# Patient Record
Sex: FEMALE | Race: BLACK OR AFRICAN AMERICAN | NOT HISPANIC OR LATINO | Employment: FULL TIME | URBAN - METROPOLITAN AREA
[De-identification: names, ages, dates, MRNs, and addresses within clinical notes are randomized per-mention and may not be internally consistent; named-entity substitution may affect disease eponyms.]

---

## 2023-03-07 ENCOUNTER — TELEPHONE (OUTPATIENT)
Dept: PRIMARY CARE | Facility: CLINIC | Age: 33
End: 2023-03-07

## 2023-03-07 NOTE — TELEPHONE ENCOUNTER
Pt has questions about receiving the wrong medication she was given metformin extended release and it was suppose to be regular can call pt at [phone]

## 2023-03-10 DIAGNOSIS — E11.9 TYPE 2 DIABETES MELLITUS WITHOUT COMPLICATION, WITHOUT LONG-TERM CURRENT USE OF INSULIN (MULTI): Primary | ICD-10-CM

## 2023-03-10 RX ORDER — METFORMIN HYDROCHLORIDE 500 MG/1
1000 TABLET, EXTENDED RELEASE ORAL
Qty: 180 TABLET | Refills: 0 | Status: SHIPPED
Start: 2023-03-10 | End: 2023-04-19 | Stop reason: SINTOL

## 2023-04-19 ENCOUNTER — OFFICE VISIT (OUTPATIENT)
Dept: PRIMARY CARE | Facility: CLINIC | Age: 33
End: 2023-04-19
Payer: COMMERCIAL

## 2023-04-19 VITALS
SYSTOLIC BLOOD PRESSURE: 106 MMHG | HEIGHT: 63 IN | BODY MASS INDEX: 34.02 KG/M2 | WEIGHT: 192 LBS | HEART RATE: 103 BPM | DIASTOLIC BLOOD PRESSURE: 69 MMHG | TEMPERATURE: 97.5 F

## 2023-04-19 DIAGNOSIS — I10 HYPERTENSION, UNSPECIFIED TYPE: ICD-10-CM

## 2023-04-19 DIAGNOSIS — G44.209 TENSION HEADACHE: ICD-10-CM

## 2023-04-19 DIAGNOSIS — E11.9 DIABETES MELLITUS TYPE 2 WITHOUT RETINOPATHY (MULTI): Primary | ICD-10-CM

## 2023-04-19 PROBLEM — I44.0 FIRST DEGREE AV BLOCK: Status: ACTIVE | Noted: 2023-04-19

## 2023-04-19 PROBLEM — G43.909 MIGRAINE HEADACHE: Status: ACTIVE | Noted: 2023-04-19

## 2023-04-19 PROBLEM — M54.6 CHRONIC THORACIC BACK PAIN: Status: ACTIVE | Noted: 2023-04-19

## 2023-04-19 PROBLEM — D24.1 INTRADUCTAL PAPILLOMA OF RIGHT BREAST: Status: ACTIVE | Noted: 2023-04-19

## 2023-04-19 PROBLEM — E78.3 HYPERCHYLOMICRONEMIA: Status: ACTIVE | Noted: 2023-04-19

## 2023-04-19 PROBLEM — M62.82 RHABDOMYOLYSIS: Status: ACTIVE | Noted: 2023-04-19

## 2023-04-19 PROBLEM — G89.29 CHRONIC THORACIC BACK PAIN: Status: ACTIVE | Noted: 2023-04-19

## 2023-04-19 PROBLEM — N63.10 BREAST MASS, RIGHT: Status: ACTIVE | Noted: 2023-04-19

## 2023-04-19 PROBLEM — E66.9 OBESITY: Status: ACTIVE | Noted: 2023-04-19

## 2023-04-19 PROBLEM — R74.8 ELEVATED LIVER ENZYMES: Status: ACTIVE | Noted: 2023-04-19

## 2023-04-19 PROBLEM — I44.1 SECOND DEGREE AV BLOCK, MOBITZ TYPE I: Status: ACTIVE | Noted: 2023-04-19

## 2023-04-19 PROCEDURE — 3078F DIAST BP <80 MM HG: CPT | Performed by: FAMILY MEDICINE

## 2023-04-19 PROCEDURE — 99214 OFFICE O/P EST MOD 30 MIN: CPT | Performed by: FAMILY MEDICINE

## 2023-04-19 PROCEDURE — 3074F SYST BP LT 130 MM HG: CPT | Performed by: FAMILY MEDICINE

## 2023-04-19 RX ORDER — FLASH GLUCOSE SENSOR
KIT MISCELLANEOUS
COMMUNITY
Start: 2023-02-15 | End: 2023-07-19 | Stop reason: SDUPTHER

## 2023-04-19 RX ORDER — SEMAGLUTIDE 2.68 MG/ML
INJECTION, SOLUTION SUBCUTANEOUS
COMMUNITY
Start: 2022-09-16 | End: 2023-07-19 | Stop reason: SDUPTHER

## 2023-04-19 RX ORDER — LOSARTAN POTASSIUM AND HYDROCHLOROTHIAZIDE 25; 100 MG/1; MG/1
1 TABLET ORAL DAILY
Qty: 90 TABLET | Refills: 1 | Status: SHIPPED
Start: 2023-04-19 | End: 2023-11-09 | Stop reason: SDUPTHER

## 2023-04-19 RX ORDER — LOSARTAN POTASSIUM AND HYDROCHLOROTHIAZIDE 25; 100 MG/1; MG/1
1 TABLET ORAL DAILY
COMMUNITY
Start: 2016-12-19 | End: 2023-04-19

## 2023-04-19 RX ORDER — METFORMIN HYDROCHLORIDE 500 MG/1
TABLET ORAL 2 TIMES DAILY
COMMUNITY
End: 2023-07-19 | Stop reason: SDUPTHER

## 2023-04-19 RX ORDER — LOSARTAN POTASSIUM AND HYDROCHLOROTHIAZIDE 12.5; 1 MG/1; MG/1
TABLET ORAL
COMMUNITY
End: 2023-04-19

## 2023-04-19 RX ORDER — FLASH GLUCOSE SENSOR
KIT MISCELLANEOUS
Qty: 6 EACH | Refills: 1 | Status: SHIPPED
Start: 2023-04-19 | End: 2023-11-09 | Stop reason: SDUPTHER

## 2023-04-19 ASSESSMENT — PAIN SCALES - GENERAL: PAINLEVEL: 10-WORST PAIN EVER

## 2023-04-19 NOTE — PROGRESS NOTES
Subjective   Patient ID: Mica Ross is a 32 y.o. female who presents for Follow-up (Pain in back of neck ).  HPI  The patient is here for the management of a  left occipital pain.  It started a months ago.  It is worse when she is stressed.  Better with NSAIDS OTC.  No associated paresthesia or known local trauma.    A review of system was completed.  All systems were reviewed and were normal, except for the ones that are listed in the HPI.    Objective   Physical Exam  Constitutional:       Appearance: Normal appearance.   HENT:      Head: Normocephalic and atraumatic.      Right Ear: Tympanic membrane, ear canal and external ear normal.      Left Ear: Tympanic membrane, ear canal and external ear normal.      Nose: Nose normal.      Mouth/Throat:      Mouth: Mucous membranes are moist.      Pharynx: Oropharynx is clear.   Eyes:      Extraocular Movements: Extraocular movements intact.      Conjunctiva/sclera: Conjunctivae normal.      Pupils: Pupils are equal, round, and reactive to light.   Cardiovascular:      Rate and Rhythm: Normal rate and regular rhythm.      Pulses: Normal pulses.   Pulmonary:      Effort: Pulmonary effort is normal.      Breath sounds: Normal breath sounds.   Abdominal:      General: Abdomen is flat. Bowel sounds are normal.      Palpations: Abdomen is soft.   Musculoskeletal:         General: Normal range of motion.      Cervical back: Normal range of motion and neck supple.      Comments: Left low occipital tenderness to palpation.   Skin:     General: Skin is warm.   Neurological:      General: No focal deficit present.      Mental Status: She is alert and oriented to person, place, and time. Mental status is at baseline.   Psychiatric:         Mood and Affect: Mood normal.         Behavior: Behavior normal.         Thought Content: Thought content normal.         Judgment: Judgment normal.         Assessment/Plan   Problem List Items Addressed This Visit          Nervous    Tension  headache     -Medrol dose pack declined today.  -Aleve BID for 2 weeks, then PRN.  -C-spine X-ray ordered.         Relevant Orders    XR cervical spine 2-3 views       Circulatory    Hypertension    Relevant Medications    losartan-hydrochlorothiazide (Hyzaar) 100-25 mg tablet       Endocrine/Metabolic    Diabetes mellitus type 2 without retinopathy (CMS/HCC) - Primary    Relevant Medications    FreeStyle Ton sensor system (FreeStyle Ton 2 Sensor) kit

## 2023-07-19 ENCOUNTER — OFFICE VISIT (OUTPATIENT)
Dept: PRIMARY CARE | Facility: CLINIC | Age: 33
End: 2023-07-19
Payer: COMMERCIAL

## 2023-07-19 VITALS
SYSTOLIC BLOOD PRESSURE: 109 MMHG | HEART RATE: 92 BPM | TEMPERATURE: 97.6 F | HEIGHT: 63 IN | DIASTOLIC BLOOD PRESSURE: 75 MMHG | WEIGHT: 191 LBS | BODY MASS INDEX: 33.84 KG/M2

## 2023-07-19 DIAGNOSIS — R52 PAIN: ICD-10-CM

## 2023-07-19 DIAGNOSIS — E11.9 DIABETES MELLITUS TYPE 2 WITHOUT RETINOPATHY (MULTI): ICD-10-CM

## 2023-07-19 DIAGNOSIS — M54.12 CERVICAL RADICULOPATHY: Primary | ICD-10-CM

## 2023-07-19 PROCEDURE — 99214 OFFICE O/P EST MOD 30 MIN: CPT | Performed by: FAMILY MEDICINE

## 2023-07-19 PROCEDURE — 3078F DIAST BP <80 MM HG: CPT | Performed by: FAMILY MEDICINE

## 2023-07-19 PROCEDURE — 3074F SYST BP LT 130 MM HG: CPT | Performed by: FAMILY MEDICINE

## 2023-07-19 PROCEDURE — 1036F TOBACCO NON-USER: CPT | Performed by: FAMILY MEDICINE

## 2023-07-19 RX ORDER — METFORMIN HYDROCHLORIDE 500 MG/1
500 TABLET ORAL
Qty: 180 TABLET | Refills: 1 | Status: SHIPPED | OUTPATIENT
Start: 2023-07-19 | End: 2023-11-09 | Stop reason: SDUPTHER

## 2023-07-19 RX ORDER — SEMAGLUTIDE 2.68 MG/ML
2 INJECTION, SOLUTION SUBCUTANEOUS
Qty: 3 ML | Refills: 5 | Status: SHIPPED | OUTPATIENT
Start: 2023-07-19 | End: 2023-11-09 | Stop reason: SDUPTHER

## 2023-07-19 RX ORDER — FLASH GLUCOSE SENSOR
KIT MISCELLANEOUS
Qty: 2 EACH | Refills: 5 | Status: SHIPPED | OUTPATIENT
Start: 2023-07-19

## 2023-07-19 RX ORDER — NAPROXEN 500 MG/1
500 TABLET ORAL 2 TIMES DAILY PRN
Qty: 60 TABLET | Refills: 0 | Status: SHIPPED | OUTPATIENT
Start: 2023-07-19 | End: 2023-10-17

## 2023-07-19 RX ORDER — GABAPENTIN 100 MG/1
100 CAPSULE ORAL 3 TIMES DAILY
Qty: 60 CAPSULE | Refills: 2 | Status: SHIPPED
Start: 2023-07-19 | End: 2023-11-09 | Stop reason: WASHOUT

## 2023-07-19 RX ORDER — GABAPENTIN 100 MG/1
100 CAPSULE ORAL 3 TIMES DAILY
Qty: 60 CAPSULE | Refills: 2 | Status: SHIPPED
Start: 2023-07-19 | End: 2023-07-19 | Stop reason: SDUPTHER

## 2023-07-19 RX ORDER — METHYLPREDNISOLONE 4 MG/1
TABLET ORAL
Qty: 21 TABLET | Refills: 0 | Status: SHIPPED | OUTPATIENT
Start: 2023-07-19 | End: 2023-07-26

## 2023-07-19 ASSESSMENT — ENCOUNTER SYMPTOMS: NECK PAIN: 1

## 2023-07-19 ASSESSMENT — PAIN SCALES - GENERAL: PAINLEVEL: 0-NO PAIN

## 2023-07-19 NOTE — PROGRESS NOTES
Subjective   Patient ID: Mica Ross is a 32 y.o. female who presents for Neck Pain.  Neck Pain     The patient is here for a follow-up visit to discuss her left low posterior cervical pain.  It started about 4-5 months ago.  Not responding to NSAIDS.  Never radiates into the left hand.  Last C-spine Xray was normal on 4/19/2023.    A review of system was completed.  All systems were reviewed and were normal, except for the ones that are listed in the HPI.    Objective   Physical Exam  Constitutional:       Appearance: Normal appearance.   HENT:      Head: Normocephalic and atraumatic.      Right Ear: Tympanic membrane, ear canal and external ear normal.      Left Ear: Tympanic membrane, ear canal and external ear normal.      Nose: Nose normal.      Mouth/Throat:      Mouth: Mucous membranes are moist.      Pharynx: Oropharynx is clear.   Eyes:      Extraocular Movements: Extraocular movements intact.      Conjunctiva/sclera: Conjunctivae normal.      Pupils: Pupils are equal, round, and reactive to light.   Cardiovascular:      Rate and Rhythm: Normal rate and regular rhythm.      Pulses: Normal pulses.   Pulmonary:      Effort: Pulmonary effort is normal.      Breath sounds: Normal breath sounds.   Abdominal:      General: Abdomen is flat. Bowel sounds are normal.      Palpations: Abdomen is soft.   Musculoskeletal:         General: Normal range of motion.      Cervical back: Normal range of motion and neck supple.      Comments: Left low posterior cervical spine tenderness to palpation.  There is a lump/ fulness with tenderness to palpation in the low C-spine posterior region.   Skin:     General: Skin is warm.   Neurological:      General: No focal deficit present.      Mental Status: She is alert and oriented to person, place, and time. Mental status is at baseline.   Psychiatric:         Mood and Affect: Mood normal.         Behavior: Behavior normal.         Thought Content: Thought content normal.          Judgment: Judgment normal.         Assessment/Plan   Problem List Items Addressed This Visit       Diabetes mellitus type 2 without retinopathy (CMS/HCC)     -Metformin refilled.          Relevant Medications    metFORMIN (Glucophage) 500 mg tablet    semaglutide (Ozempic) 2 mg/dose (8 mg/3 mL) pen injector    FreeStyle Ton 2 Sensor kit    Cervical radiculopathy - Primary     -PT referral, Medrol dose pack and Naproxen started today.          Relevant Medications    methylPREDNISolone (Medrol Dospak) 4 mg tablets    naproxen (Naprosyn) 500 mg tablet    gabapentin (Neurontin) 100 mg capsule    Other Relevant Orders    PT eval and treat     Other Visit Diagnoses       Pain        Relevant Orders    PT eval and treat

## 2023-09-15 ENCOUNTER — HOSPITAL ENCOUNTER (OUTPATIENT)
Dept: DATA CONVERSION | Facility: HOSPITAL | Age: 33
Discharge: HOME | End: 2023-09-15
Payer: COMMERCIAL

## 2023-09-15 DIAGNOSIS — M54.12 RADICULOPATHY, CERVICAL REGION: ICD-10-CM

## 2023-10-03 ENCOUNTER — TELEPHONE (OUTPATIENT)
Dept: PRIMARY CARE | Facility: CLINIC | Age: 33
End: 2023-10-03
Payer: COMMERCIAL

## 2023-10-03 DIAGNOSIS — M54.12 CERVICAL RADICULOPATHY: Primary | ICD-10-CM

## 2023-10-03 NOTE — TELEPHONE ENCOUNTER
Patient called in today stating that she's still having pain and would like to have the MRI put in that was discussed per her last office visit.

## 2023-10-04 NOTE — TELEPHONE ENCOUNTER
MRI of the cervical spine for neck was placed in the system.  Gave to the patient the referral number for the radiology number so that she can schedule the test.

## 2023-10-18 ENCOUNTER — APPOINTMENT (OUTPATIENT)
Dept: RADIOLOGY | Facility: CLINIC | Age: 33
End: 2023-10-18
Payer: COMMERCIAL

## 2023-10-26 ENCOUNTER — TELEPHONE (OUTPATIENT)
Dept: PRIMARY CARE | Facility: CLINIC | Age: 33
End: 2023-10-26

## 2023-11-09 ENCOUNTER — OFFICE VISIT (OUTPATIENT)
Dept: PRIMARY CARE | Facility: CLINIC | Age: 33
End: 2023-11-09
Payer: COMMERCIAL

## 2023-11-09 VITALS
DIASTOLIC BLOOD PRESSURE: 84 MMHG | WEIGHT: 190 LBS | HEART RATE: 96 BPM | SYSTOLIC BLOOD PRESSURE: 115 MMHG | TEMPERATURE: 98 F | BODY MASS INDEX: 33.66 KG/M2

## 2023-11-09 DIAGNOSIS — E11.9 DIABETES MELLITUS TYPE 2 WITHOUT RETINOPATHY (MULTI): ICD-10-CM

## 2023-11-09 DIAGNOSIS — I10 HYPERTENSION, UNSPECIFIED TYPE: ICD-10-CM

## 2023-11-09 DIAGNOSIS — M54.12 CERVICAL RADICULOPATHY: Primary | ICD-10-CM

## 2023-11-09 PROCEDURE — 3074F SYST BP LT 130 MM HG: CPT | Performed by: FAMILY MEDICINE

## 2023-11-09 PROCEDURE — 3079F DIAST BP 80-89 MM HG: CPT | Performed by: FAMILY MEDICINE

## 2023-11-09 PROCEDURE — 99214 OFFICE O/P EST MOD 30 MIN: CPT | Performed by: FAMILY MEDICINE

## 2023-11-09 PROCEDURE — 1036F TOBACCO NON-USER: CPT | Performed by: FAMILY MEDICINE

## 2023-11-09 RX ORDER — FLASH GLUCOSE SENSOR
KIT MISCELLANEOUS
Qty: 6 EACH | Refills: 1 | Status: SHIPPED | OUTPATIENT
Start: 2023-11-09

## 2023-11-09 RX ORDER — METFORMIN HYDROCHLORIDE 500 MG/1
500 TABLET ORAL
Qty: 180 TABLET | Refills: 1 | Status: SHIPPED | OUTPATIENT
Start: 2023-11-09 | End: 2024-02-26 | Stop reason: SDUPTHER

## 2023-11-09 RX ORDER — SEMAGLUTIDE 2.68 MG/ML
2 INJECTION, SOLUTION SUBCUTANEOUS
Qty: 3 ML | Refills: 5 | Status: SHIPPED | OUTPATIENT
Start: 2023-11-09 | End: 2024-01-24

## 2023-11-09 RX ORDER — LOSARTAN POTASSIUM AND HYDROCHLOROTHIAZIDE 25; 100 MG/1; MG/1
1 TABLET ORAL DAILY
Qty: 90 TABLET | Refills: 1 | Status: SHIPPED | OUTPATIENT
Start: 2023-11-09 | End: 2024-02-26 | Stop reason: SDUPTHER

## 2023-11-09 ASSESSMENT — ENCOUNTER SYMPTOMS: NECK PAIN: 1

## 2023-11-09 NOTE — PROGRESS NOTES
Subjective   Patient ID: Mica Ross is a 33 y.o. female who presents for Neck Pain.  Neck Pain     The patient is here for a follow-up visit to discuss her left low posterior cervical pain.  It started about 4-5 months ago.  Not responding to NSAIDS and 6 weeks of PT.  Never radiates into the left hand.  Last C-spine Xray was normal on 4/19/2023.  She is also here for her medication refills.    A review of system was completed.  All systems were reviewed and were normal, except for the ones that are listed in the HPI.    Objective   Physical Exam  Constitutional:       Appearance: Normal appearance.   HENT:      Head: Normocephalic and atraumatic.      Right Ear: Tympanic membrane, ear canal and external ear normal.      Left Ear: Tympanic membrane, ear canal and external ear normal.      Nose: Nose normal.      Mouth/Throat:      Mouth: Mucous membranes are moist.      Pharynx: Oropharynx is clear.   Eyes:      Extraocular Movements: Extraocular movements intact.      Conjunctiva/sclera: Conjunctivae normal.      Pupils: Pupils are equal, round, and reactive to light.   Cardiovascular:      Rate and Rhythm: Normal rate and regular rhythm.      Pulses: Normal pulses.   Pulmonary:      Effort: Pulmonary effort is normal.      Breath sounds: Normal breath sounds.   Abdominal:      General: Abdomen is flat. Bowel sounds are normal.      Palpations: Abdomen is soft.   Musculoskeletal:         General: Normal range of motion.      Cervical back: Normal range of motion and neck supple.      Comments: Left low posterior cervical spine tenderness to palpation.  There is a lump/ fulness with tenderness to palpation in the low C-spine posterior region.   Skin:     General: Skin is warm.   Neurological:      General: No focal deficit present.      Mental Status: She is alert and oriented to person, place, and time. Mental status is at baseline.   Psychiatric:         Mood and Affect: Mood normal.         Behavior: Behavior  normal.         Thought Content: Thought content normal.         Judgment: Judgment normal.     Assessment/Plan   Problem List Items Addressed This Visit       Diabetes mellitus type 2 without retinopathy (CMS/HCC)     -Blood test ordered.  -Medications refilled.  -Influenza and Prevnar declined.   -Continue diet and exercise.           Relevant Medications    semaglutide (Ozempic) 2 mg/dose (8 mg/3 mL) pen injector    metFORMIN (Glucophage) 500 mg tablet    FreeStyle Ton sensor system (FreeStyle Ton 2 Sensor) kit    Hypertension    Relevant Medications    losartan-hydrochlorothiazide (Hyzaar) 100-25 mg tablet    Cervical radiculopathy - Primary     -suspect a cervical radiculitis.   -MRI C-spine ordered.   -Pain management referral done today as well.          Relevant Orders    MR cervical spine wo IV contrast

## 2023-11-09 NOTE — ASSESSMENT & PLAN NOTE
-Blood test ordered.  -Medications refilled.  -Influenza and Prevnar declined.   -Continue diet and exercise.

## 2023-11-09 NOTE — ASSESSMENT & PLAN NOTE
-suspect a cervical radiculitis.   -MRI C-spine ordered.   -Pain management referral done today as well.

## 2023-11-24 ENCOUNTER — ANCILLARY PROCEDURE (OUTPATIENT)
Dept: RADIOLOGY | Facility: CLINIC | Age: 33
End: 2023-11-24
Payer: COMMERCIAL

## 2023-11-24 DIAGNOSIS — M54.12 CERVICAL RADICULOPATHY: ICD-10-CM

## 2023-11-24 PROCEDURE — 72141 MRI NECK SPINE W/O DYE: CPT | Performed by: RADIOLOGY

## 2023-11-24 PROCEDURE — 72141 MRI NECK SPINE W/O DYE: CPT

## 2024-02-26 ENCOUNTER — OFFICE VISIT (OUTPATIENT)
Dept: PRIMARY CARE | Facility: CLINIC | Age: 34
End: 2024-02-26
Payer: COMMERCIAL

## 2024-02-26 ENCOUNTER — LAB (OUTPATIENT)
Dept: LAB | Facility: LAB | Age: 34
End: 2024-02-26
Payer: COMMERCIAL

## 2024-02-26 VITALS
TEMPERATURE: 97.8 F | DIASTOLIC BLOOD PRESSURE: 82 MMHG | HEART RATE: 98 BPM | BODY MASS INDEX: 34.54 KG/M2 | SYSTOLIC BLOOD PRESSURE: 115 MMHG | WEIGHT: 195 LBS

## 2024-02-26 DIAGNOSIS — Z13.1 DIABETES MELLITUS SCREENING: ICD-10-CM

## 2024-02-26 DIAGNOSIS — Z00.00 HEALTH CARE MAINTENANCE: Primary | ICD-10-CM

## 2024-02-26 DIAGNOSIS — Z11.59 ENCOUNTER FOR HEPATITIS C SCREENING TEST FOR LOW RISK PATIENT: ICD-10-CM

## 2024-02-26 DIAGNOSIS — E11.9 DIABETES MELLITUS TYPE 2 WITHOUT RETINOPATHY (MULTI): ICD-10-CM

## 2024-02-26 DIAGNOSIS — I10 HYPERTENSION, UNSPECIFIED TYPE: ICD-10-CM

## 2024-02-26 DIAGNOSIS — Z13.220 SCREENING CHOLESTEROL LEVEL: ICD-10-CM

## 2024-02-26 DIAGNOSIS — Z23 IMMUNIZATION DUE: ICD-10-CM

## 2024-02-26 DIAGNOSIS — Z01.00 ENCOUNTER FOR VISION SCREENING: ICD-10-CM

## 2024-02-26 DIAGNOSIS — Z11.4 ENCOUNTER FOR SCREENING FOR HIV: ICD-10-CM

## 2024-02-26 LAB
ALBUMIN SERPL BCP-MCNC: 4.4 G/DL (ref 3.4–5)
ALP SERPL-CCNC: 38 U/L (ref 33–110)
ALT SERPL W P-5'-P-CCNC: 28 U/L (ref 7–45)
ANION GAP SERPL CALC-SCNC: 12 MMOL/L (ref 10–20)
AST SERPL W P-5'-P-CCNC: 20 U/L (ref 9–39)
BILIRUB SERPL-MCNC: 0.5 MG/DL (ref 0–1.2)
BUN SERPL-MCNC: 12 MG/DL (ref 6–23)
CALCIUM SERPL-MCNC: 9.8 MG/DL (ref 8.6–10.6)
CHLORIDE SERPL-SCNC: 103 MMOL/L (ref 98–107)
CHOLEST SERPL-MCNC: 99 MG/DL (ref 0–199)
CHOLESTEROL/HDL RATIO: 2.9
CO2 SERPL-SCNC: 29 MMOL/L (ref 21–32)
CREAT SERPL-MCNC: 1.01 MG/DL (ref 0.5–1.05)
CREAT UR-MCNC: 273.3 MG/DL (ref 20–320)
EGFRCR SERPLBLD CKD-EPI 2021: 75 ML/MIN/1.73M*2
ERYTHROCYTE [DISTWIDTH] IN BLOOD BY AUTOMATED COUNT: 12.6 % (ref 11.5–14.5)
EST. AVERAGE GLUCOSE BLD GHB EST-MCNC: 126 MG/DL
GLUCOSE SERPL-MCNC: 123 MG/DL (ref 74–99)
HBA1C MFR BLD: 6 %
HCT VFR BLD AUTO: 40.5 % (ref 36–46)
HCV AB SER QL: NONREACTIVE
HDLC SERPL-MCNC: 34.7 MG/DL
HGB BLD-MCNC: 14.2 G/DL (ref 12–16)
LDLC SERPL CALC-MCNC: 49 MG/DL
MCH RBC QN AUTO: 30.3 PG (ref 26–34)
MCHC RBC AUTO-ENTMCNC: 35.1 G/DL (ref 32–36)
MCV RBC AUTO: 86 FL (ref 80–100)
MICROALBUMIN UR-MCNC: 11.7 MG/L
MICROALBUMIN/CREAT UR: 4.3 UG/MG CREAT
NON HDL CHOLESTEROL: 64 MG/DL (ref 0–149)
NRBC BLD-RTO: 0 /100 WBCS (ref 0–0)
PLATELET # BLD AUTO: 310 X10*3/UL (ref 150–450)
POTASSIUM SERPL-SCNC: 3.7 MMOL/L (ref 3.5–5.3)
PROT SERPL-MCNC: 7.1 G/DL (ref 6.4–8.2)
RBC # BLD AUTO: 4.69 X10*6/UL (ref 4–5.2)
SODIUM SERPL-SCNC: 140 MMOL/L (ref 136–145)
TRIGL SERPL-MCNC: 76 MG/DL (ref 0–149)
TSH SERPL-ACNC: 2.02 MIU/L (ref 0.44–3.98)
VLDL: 15 MG/DL (ref 0–40)
WBC # BLD AUTO: 6.3 X10*3/UL (ref 4.4–11.3)

## 2024-02-26 PROCEDURE — 36415 COLL VENOUS BLD VENIPUNCTURE: CPT

## 2024-02-26 PROCEDURE — 86803 HEPATITIS C AB TEST: CPT

## 2024-02-26 PROCEDURE — 82570 ASSAY OF URINE CREATININE: CPT

## 2024-02-26 PROCEDURE — 1036F TOBACCO NON-USER: CPT | Performed by: FAMILY MEDICINE

## 2024-02-26 PROCEDURE — 3074F SYST BP LT 130 MM HG: CPT | Performed by: FAMILY MEDICINE

## 2024-02-26 PROCEDURE — 82043 UR ALBUMIN QUANTITATIVE: CPT

## 2024-02-26 PROCEDURE — 84443 ASSAY THYROID STIM HORMONE: CPT

## 2024-02-26 PROCEDURE — 99395 PREV VISIT EST AGE 18-39: CPT | Performed by: FAMILY MEDICINE

## 2024-02-26 PROCEDURE — 80053 COMPREHEN METABOLIC PANEL: CPT

## 2024-02-26 PROCEDURE — 85027 COMPLETE CBC AUTOMATED: CPT

## 2024-02-26 PROCEDURE — 83036 HEMOGLOBIN GLYCOSYLATED A1C: CPT

## 2024-02-26 PROCEDURE — 80061 LIPID PANEL: CPT

## 2024-02-26 PROCEDURE — 3079F DIAST BP 80-89 MM HG: CPT | Performed by: FAMILY MEDICINE

## 2024-02-26 RX ORDER — TIRZEPATIDE 10 MG/.5ML
10 INJECTION, SOLUTION SUBCUTANEOUS
Qty: 2 ML | Refills: 1 | Status: SHIPPED
Start: 2024-02-26 | End: 2024-04-14 | Stop reason: WASHOUT

## 2024-02-26 RX ORDER — SEMAGLUTIDE 2.68 MG/ML
2 INJECTION, SOLUTION SUBCUTANEOUS
Qty: 9 ML | Refills: 1 | Status: SHIPPED | OUTPATIENT
Start: 2024-02-26 | End: 2024-04-14 | Stop reason: SDUPTHER

## 2024-02-26 RX ORDER — TIRZEPATIDE 12.5 MG/.5ML
12.5 INJECTION, SOLUTION SUBCUTANEOUS
Qty: 2 ML | Refills: 1 | Status: SHIPPED
Start: 2024-02-26 | End: 2024-04-14 | Stop reason: WASHOUT

## 2024-02-26 RX ORDER — LOSARTAN POTASSIUM AND HYDROCHLOROTHIAZIDE 25; 100 MG/1; MG/1
1 TABLET ORAL DAILY
Qty: 90 TABLET | Refills: 1 | Status: SHIPPED | OUTPATIENT
Start: 2024-02-26

## 2024-02-26 RX ORDER — METFORMIN HYDROCHLORIDE 500 MG/1
500 TABLET ORAL
Qty: 180 TABLET | Refills: 1 | Status: SHIPPED | OUTPATIENT
Start: 2024-02-26

## 2024-02-26 ASSESSMENT — PAIN SCALES - GENERAL: PAINLEVEL: 0-NO PAIN

## 2024-02-26 NOTE — PROGRESS NOTES
Subjective   Patient ID: Mica Ross is a 33 y.o. female who presents for Annual Exam (Check up ).  HPI    The patient is a 34 yo A female with history of DM II, HTN, obesity, migraine headaches, exercise induced rhabdomyolysis, here for:    1- CPE.  - blood and urine tests due.  - pap smear 9/13/2023.  Wnl.  No HPV.     2- Medication refills.  She did not loose weight on Ozempic but her DM II control improved.     3- DM II.  -eye exam was about 6 months ago at [facility].    A review of system was completed.  All systems were reviewed and were normal, except for the ones that are listed in the HPI.    Objective   Physical Exam  Constitutional:       Appearance: Normal appearance.   HENT:      Head: Normocephalic and atraumatic.      Right Ear: Tympanic membrane, ear canal and external ear normal.      Left Ear: Tympanic membrane, ear canal and external ear normal.      Nose: Nose normal.      Mouth/Throat:      Mouth: Mucous membranes are moist.      Pharynx: Oropharynx is clear.   Eyes:      Extraocular Movements: Extraocular movements intact.      Conjunctiva/sclera: Conjunctivae normal.      Pupils: Pupils are equal, round, and reactive to light.   Cardiovascular:      Rate and Rhythm: Normal rate and regular rhythm.      Pulses: Normal pulses.   Pulmonary:      Effort: Pulmonary effort is normal.      Breath sounds: Normal breath sounds.   Abdominal:      General: Abdomen is flat. Bowel sounds are normal.      Palpations: Abdomen is soft.   Musculoskeletal:         General: Normal range of motion.      Cervical back: Normal range of motion and neck supple.   Skin:     General: Skin is warm.   Neurological:      General: No focal deficit present.      Mental Status: She is alert and oriented to person, place, and time. Mental status is at baseline.   Psychiatric:         Mood and Affect: Mood normal.         Behavior: Behavior normal.         Thought Content: Thought content normal.         Judgment: Judgment  normal.     Assessment/Plan   Problem List Items Addressed This Visit       Diabetes mellitus type 2 without retinopathy (CMS/HCC)     -Weight gain not well controlled with Ozempic.  Changed to Mounjaro 10 mg/ week and 12.5 mg /week.          Relevant Medications    metFORMIN (Glucophage) 500 mg tablet    semaglutide (Ozempic) 2 mg/dose (8 mg/3 mL) pen injector    tirzepatide (Mounjaro) 10 mg/0.5 mL pen injector    tirzepatide (Mounjaro) 12.5 mg/0.5 mL pen injector    Other Relevant Orders    Albumin , Urine Random    CBC    Comprehensive Metabolic Panel    Hemoglobin A1C    Lipid Panel    TSH with reflex to Free T4 if abnormal    Hypertension    Relevant Medications    losartan-hydrochlorothiazide (Hyzaar) 100-25 mg tablet    Health care maintenance - Primary     - blood and urine tests due.  - pap smear 9/13/2023.  Wnl.  No HPV.            Encounter for screening for HIV    Encounter for hepatitis C screening test for low risk patient    Relevant Orders    Hepatitis C Antibody    Screening cholesterol level    Diabetes mellitus screening    Immunization due    Encounter for vision screening      Patient to return to office in 6 months for routine care.

## 2024-02-26 NOTE — ASSESSMENT & PLAN NOTE
-Weight gain not well controlled with Ozempic.  Changed to Mounjaro 10 mg/ week and 12.5 mg /week.

## 2024-04-12 ENCOUNTER — TELEPHONE (OUTPATIENT)
Dept: PRIMARY CARE | Facility: CLINIC | Age: 34
End: 2024-04-12
Payer: COMMERCIAL

## 2024-04-12 DIAGNOSIS — E11.9 DIABETES MELLITUS TYPE 2 WITHOUT RETINOPATHY (MULTI): ICD-10-CM

## 2024-04-12 NOTE — TELEPHONE ENCOUNTER
Pt states that the mounjaro is making her have muscle aches and pain pt wants to go back to ozempic as prescribed please advise.

## 2024-04-14 DIAGNOSIS — E11.9 DIABETES MELLITUS TYPE 2 WITHOUT RETINOPATHY (MULTI): ICD-10-CM

## 2024-04-14 RX ORDER — SEMAGLUTIDE 2.68 MG/ML
2 INJECTION, SOLUTION SUBCUTANEOUS
Qty: 9 ML | Refills: 1 | Status: SHIPPED | OUTPATIENT
Start: 2024-04-14 | End: 2024-04-14 | Stop reason: SDUPTHER

## 2024-04-14 RX ORDER — SEMAGLUTIDE 2.68 MG/ML
2 INJECTION, SOLUTION SUBCUTANEOUS
Qty: 9 ML | Refills: 1 | Status: SHIPPED | OUTPATIENT
Start: 2024-04-14

## 2024-06-02 DIAGNOSIS — E11.9 DIABETES MELLITUS TYPE 2 WITHOUT RETINOPATHY (MULTI): ICD-10-CM

## 2024-06-02 DIAGNOSIS — E78.2 MIXED HYPERLIPIDEMIA: ICD-10-CM

## 2024-06-03 RX ORDER — ATORVASTATIN CALCIUM 10 MG/1
10 TABLET, FILM COATED ORAL DAILY
Qty: 90 TABLET | Refills: 3 | Status: SHIPPED | OUTPATIENT
Start: 2024-06-03

## 2024-06-07 RX ORDER — FLASH GLUCOSE SENSOR
KIT MISCELLANEOUS
Qty: 100 EACH | Refills: 0 | Status: SHIPPED | OUTPATIENT
Start: 2024-06-07

## 2024-08-28 ENCOUNTER — OFFICE VISIT (OUTPATIENT)
Dept: PRIMARY CARE | Facility: CLINIC | Age: 34
End: 2024-08-28
Payer: COMMERCIAL

## 2024-08-28 ENCOUNTER — LAB (OUTPATIENT)
Dept: LAB | Facility: LAB | Age: 34
End: 2024-08-28
Payer: COMMERCIAL

## 2024-08-28 ENCOUNTER — APPOINTMENT (OUTPATIENT)
Dept: LAB | Facility: LAB | Age: 34
End: 2024-08-28
Payer: COMMERCIAL

## 2024-08-28 VITALS
BODY MASS INDEX: 33.83 KG/M2 | SYSTOLIC BLOOD PRESSURE: 104 MMHG | HEART RATE: 111 BPM | WEIGHT: 191 LBS | DIASTOLIC BLOOD PRESSURE: 74 MMHG | TEMPERATURE: 96.9 F

## 2024-08-28 DIAGNOSIS — Z11.1 SCREENING-PULMONARY TB: ICD-10-CM

## 2024-08-28 DIAGNOSIS — Z11.1 SCREENING EXAMINATION FOR PULMONARY TUBERCULOSIS: ICD-10-CM

## 2024-08-28 DIAGNOSIS — E11.9 DIABETES MELLITUS TYPE 2 WITHOUT RETINOPATHY (MULTI): Primary | ICD-10-CM

## 2024-08-28 DIAGNOSIS — E66.01 CLASS 2 SEVERE OBESITY DUE TO EXCESS CALORIES WITH SERIOUS COMORBIDITY AND BODY MASS INDEX (BMI) OF 35.0 TO 35.9 IN ADULT (MULTI): ICD-10-CM

## 2024-08-28 PROCEDURE — 36415 COLL VENOUS BLD VENIPUNCTURE: CPT

## 2024-08-28 PROCEDURE — 3061F NEG MICROALBUMINURIA REV: CPT | Performed by: FAMILY MEDICINE

## 2024-08-28 PROCEDURE — 86481 TB AG RESPONSE T-CELL SUSP: CPT

## 2024-08-28 PROCEDURE — 3074F SYST BP LT 130 MM HG: CPT | Performed by: FAMILY MEDICINE

## 2024-08-28 PROCEDURE — 3048F LDL-C <100 MG/DL: CPT | Performed by: FAMILY MEDICINE

## 2024-08-28 PROCEDURE — 3044F HG A1C LEVEL LT 7.0%: CPT | Performed by: FAMILY MEDICINE

## 2024-08-28 PROCEDURE — 1036F TOBACCO NON-USER: CPT | Performed by: FAMILY MEDICINE

## 2024-08-28 PROCEDURE — 99214 OFFICE O/P EST MOD 30 MIN: CPT | Performed by: FAMILY MEDICINE

## 2024-08-28 PROCEDURE — 3078F DIAST BP <80 MM HG: CPT | Performed by: FAMILY MEDICINE

## 2024-08-28 ASSESSMENT — PAIN SCALES - GENERAL: PAINLEVEL: 6

## 2024-08-28 NOTE — PROGRESS NOTES
Subjective   Patient ID: Mica Ross is a 34 y.o. female who presents for Medication Reaction (Ozempic and neck pain).  HPI    The patient is a 34 yo A female with history of DM II, HTN, obesity, migraine headaches, exercise induced rhabdomyolysis, here for:  1- a discussion about OZEMPIC side effects.  She thinks that it is causing some side effects and she weaned herself of the medication but her sugar increased slowly.    She had side effects from Mounjaro.  Trulicity did not help with the weight loss.    A review of system was completed.  All systems were reviewed and were normal, except for the ones that are listed in the HPI.    Objective   Physical Exam  Constitutional:       Appearance: Normal appearance.   HENT:      Head: Normocephalic and atraumatic.      Right Ear: Tympanic membrane, ear canal and external ear normal.      Left Ear: Tympanic membrane, ear canal and external ear normal.      Nose: Nose normal.      Mouth/Throat:      Mouth: Mucous membranes are moist.      Pharynx: Oropharynx is clear.   Eyes:      Extraocular Movements: Extraocular movements intact.      Conjunctiva/sclera: Conjunctivae normal.      Pupils: Pupils are equal, round, and reactive to light.   Cardiovascular:      Rate and Rhythm: Normal rate and regular rhythm.      Pulses: Normal pulses.   Pulmonary:      Effort: Pulmonary effort is normal.      Breath sounds: Normal breath sounds.   Abdominal:      General: Abdomen is flat. Bowel sounds are normal.      Palpations: Abdomen is soft.   Musculoskeletal:         General: Normal range of motion.      Cervical back: Normal range of motion and neck supple.   Skin:     General: Skin is warm.   Neurological:      General: No focal deficit present.      Mental Status: She is alert and oriented to person, place, and time. Mental status is at baseline.   Psychiatric:         Mood and Affect: Mood normal.         Behavior: Behavior normal.         Thought Content: Thought content  normal.         Judgment: Judgment normal.   Assessment/Plan   Problem List Items Addressed This Visit       Diabetes mellitus type 2 without retinopathy (Multi) - Primary     -Zepbound started today at 5 mg /week.  -follow-up in 4- 12 weeks.         Relevant Medications    tirzepatide, weight loss, (Zepbound) 5 mg/0.5 mL injection    Obesity    Relevant Medications    tirzepatide, weight loss, (Zepbound) 5 mg/0.5 mL injection    Screening-pulmonary TB    Relevant Orders    T-Spot TB     Other Visit Diagnoses       Screening examination for pulmonary tuberculosis        Relevant Orders    T-Spot TB         Patient to return to office in 4- 12 weeks.

## 2024-08-31 LAB
NIL(NEG) CONTROL SPOT COUNT: NORMAL
PANEL A SPOT COUNT: 0
PANEL B SPOT COUNT: 0
POS CONTROL SPOT COUNT: NORMAL
T-SPOT. TB INTERPRETATION: NEGATIVE

## 2024-09-01 ENCOUNTER — PATIENT MESSAGE (OUTPATIENT)
Dept: PRIMARY CARE | Facility: CLINIC | Age: 34
End: 2024-09-01
Payer: COMMERCIAL

## 2024-09-01 DIAGNOSIS — E11.9 DIABETES MELLITUS TYPE 2 WITHOUT RETINOPATHY (MULTI): Primary | ICD-10-CM

## 2024-09-04 DIAGNOSIS — E78.2 MIXED HYPERLIPIDEMIA: ICD-10-CM

## 2024-09-04 RX ORDER — DULAGLUTIDE 4.5 MG/.5ML
4.5 INJECTION, SOLUTION SUBCUTANEOUS WEEKLY
Qty: 2 ML | Refills: 5 | Status: SHIPPED | OUTPATIENT
Start: 2024-09-04

## 2024-09-09 RX ORDER — ATORVASTATIN CALCIUM 10 MG/1
10 TABLET, FILM COATED ORAL DAILY
Qty: 90 TABLET | Refills: 3 | Status: SHIPPED | OUTPATIENT
Start: 2024-09-09 | End: 2024-09-12 | Stop reason: SDUPTHER

## 2024-09-12 ENCOUNTER — OFFICE VISIT (OUTPATIENT)
Dept: CARDIOLOGY | Facility: CLINIC | Age: 34
End: 2024-09-12
Payer: COMMERCIAL

## 2024-09-12 VITALS
OXYGEN SATURATION: 95 % | BODY MASS INDEX: 34.02 KG/M2 | HEART RATE: 100 BPM | WEIGHT: 192 LBS | HEIGHT: 63 IN | SYSTOLIC BLOOD PRESSURE: 104 MMHG | DIASTOLIC BLOOD PRESSURE: 82 MMHG

## 2024-09-12 DIAGNOSIS — E78.2 MIXED HYPERLIPIDEMIA: ICD-10-CM

## 2024-09-12 DIAGNOSIS — I10 PRIMARY HYPERTENSION: Primary | ICD-10-CM

## 2024-09-12 PROCEDURE — 3061F NEG MICROALBUMINURIA REV: CPT

## 2024-09-12 PROCEDURE — 99214 OFFICE O/P EST MOD 30 MIN: CPT

## 2024-09-12 PROCEDURE — 3079F DIAST BP 80-89 MM HG: CPT

## 2024-09-12 PROCEDURE — 3074F SYST BP LT 130 MM HG: CPT

## 2024-09-12 PROCEDURE — 3048F LDL-C <100 MG/DL: CPT

## 2024-09-12 PROCEDURE — 1036F TOBACCO NON-USER: CPT

## 2024-09-12 PROCEDURE — 3008F BODY MASS INDEX DOCD: CPT

## 2024-09-12 PROCEDURE — 3044F HG A1C LEVEL LT 7.0%: CPT

## 2024-09-12 RX ORDER — ATORVASTATIN CALCIUM 10 MG/1
10 TABLET, FILM COATED ORAL DAILY
Qty: 90 TABLET | Refills: 3 | Status: SHIPPED | OUTPATIENT
Start: 2024-09-12

## 2024-09-12 ASSESSMENT — ENCOUNTER SYMPTOMS: OCCASIONAL FEELINGS OF UNSTEADINESS: 0

## 2024-09-12 NOTE — PROGRESS NOTES
"Chief Complaint:   Follow-up     History Of Present Illness:    Mica is a 34 year old female with a PMH of LVEF 50-55% with mildly reduced right ventricular systolic function TAPSE 15 on echocardiogram 08/26/22, normal exercise stress test 8/5/2022, HTN and type 2 diabetes is here as a FUV.      She reports to doing well. She states HR has been in the 100's at rest. She just started an exercise regimen. She denies chest pain or SOB.  Patient denies dyspnea on exertion, orthopnea, and paroxysmal nocturnal dyspnea.  Pt denies worsening lower extremity edema.  Pt denies palpitations or syncope.  No recent falls.  No fever or chills.  No cough.  No change in bowel or bladder habits.  No sick contacts.  No recent travel    Review of Systems   All other systems reviewed and are negative.      Last Recorded Vitals:  Vitals:    09/12/24 1317   BP: 104/82   Pulse: 100   SpO2: 95%   Weight: 87.1 kg (192 lb)   Height: 1.6 m (5' 3\")       Past Medical History:  She has a past medical history of Acute upper respiratory infection, unspecified (07/01/2016), Acute vaginitis, Atypical squamous cells of undetermined significance on cytologic smear of cervix (ASC-US) (07/20/2013), Candidiasis, unspecified, Cervical shortening, unspecified trimester ([facility]), Elevated blood-pressure reading, without diagnosis of hypertension (06/15/2015), Encounter for screening for malignant neoplasm of vagina, Gestational diabetes mellitus in pregnancy, unspecified control ([facility]), Less than 8 weeks gestation of pregnancy ([facility]) (11/20/2017), Other conditions influencing health status (11/03/2017), Other conditions influencing health status, Other specified noninflammatory disorders of vagina, Personal history of other diseases of the nervous system and sense organs, Personal history of other diseases of the nervous system and sense organs (08/02/2013), Personal history of other diseases of the respiratory system, Personal history of other " specified conditions, Personal history of other specified conditions, Personal history of other specified conditions (11/07/2013), Stillbirth, Type A blood, Rh positive, and Vomiting, unspecified.    Past Surgical History:  She has a past surgical history that includes Other surgical history (08/02/2013); Other surgical history (08/02/2013); Other surgical history (04/09/2019); BI US guided breast localization and biopsy left (Left, 12/22/2020); and BI US guided breast localization left (Left, 3/2/2021).      Social History:  She reports that she has never smoked. She has never been exposed to tobacco smoke. She has never used smokeless tobacco. She reports current alcohol use. She reports that she does not use drugs.    Family History:  No family history on file.     Allergies:  Dapagliflozin and Penicillins    Outpatient Medications:  Current Outpatient Medications   Medication Instructions    atorvastatin (LIPITOR) 10 mg, oral, Daily    flash glucose sensor kit (FreeStyle Ton 2 Sensor) kit Use as instructed    FreeStyle Ton 2 Sensor kit 1 every two weeks    FreeStyle Ton sensor system (FreeStyle Ton 2 Sensor) kit Use as instructed    losartan-hydrochlorothiazide (Hyzaar) 100-25 mg tablet 1 tablet, oral, Daily    metFORMIN (GLUCOPHAGE) 500 mg, oral, 2 times daily (morning and late afternoon)    Trulicity 4.5 mg, subcutaneous, Weekly       Physical Exam  Constitutional:       Appearance: Normal appearance.   Neck:      Vascular: No carotid bruit.   Cardiovascular:      Rate and Rhythm: Normal rate and regular rhythm.      Heart sounds: No murmur heard.  Pulmonary:      Effort: Pulmonary effort is normal.      Breath sounds: Normal breath sounds.   Abdominal:      Palpations: Abdomen is soft.   Skin:     General: Skin is warm.   Neurological:      Mental Status: She is alert and oriented to person, place, and time.   Psychiatric:         Mood and Affect: Mood normal.         Last Labs:  CBC -  Lab Results    Component Value Date    WBC 6.3 02/26/2024    HGB 14.2 02/26/2024    HCT 40.5 02/26/2024    MCV 86 02/26/2024     02/26/2024       CMP -  Lab Results   Component Value Date    CALCIUM 9.8 02/26/2024    PROT 7.1 02/26/2024    ALBUMIN 4.4 02/26/2024    AST 20 02/26/2024    ALT 28 02/26/2024    ALKPHOS 38 02/26/2024    BILITOT 0.5 02/26/2024       LIPID PANEL -   Lab Results   Component Value Date    CHOL 99 02/26/2024    TRIG 76 02/26/2024    HDL 34.7 02/26/2024    CHHDL 2.9 02/26/2024    LDLF 123 (H) 08/05/2022    VLDL 15 02/26/2024    NHDL 64 02/26/2024       RENAL FUNCTION PANEL -   Lab Results   Component Value Date    GLUCOSE 123 (H) 02/26/2024     02/26/2024    K 3.7 02/26/2024     02/26/2024    CO2 29 02/26/2024    ANIONGAP 12 02/26/2024    BUN 12 02/26/2024    CREATININE 1.01 02/26/2024    CALCIUM 9.8 02/26/2024    ALBUMIN 4.4 02/26/2024        Lab Results   Component Value Date    HGBA1C 6.0 (H) 02/26/2024       Assessment/Plan   Mica is a 34 year old female with a PMH of LVEF 50-55% with mildly reduced right ventricular systolic function TAPSE 15 on echocardiogram 08/26/22, normal exercise stress test 8/5/2022, HTN and type 2 diabetes is here as a FUV.      She reports to doing well. She states HR has been in the 100's at rest. She just started an exercise regimen. She denies chest pain or SOB. LDL is at goal  as well as BP. She denies cardiac complaints. Today she is normotensive and appears euvolemic.      We discussed increased activity to help with cardiac conditioning . She is going to start working out at [employer] which is a [employer] work out.     Patient to continue taking atorvastatin 10 mg daily, losartan-hydrochlorothiazide 100-25 mg daily.    Follow up in 1 year    Estephania Plaza, APRN-CNP

## 2024-10-18 PROCEDURE — 87101 SKIN FUNGI CULTURE: CPT

## 2024-10-21 ENCOUNTER — LAB REQUISITION (OUTPATIENT)
Dept: LAB | Facility: HOSPITAL | Age: 34
End: 2024-10-21
Payer: COMMERCIAL

## 2024-10-21 DIAGNOSIS — L73.8 OTHER SPECIFIED FOLLICULAR DISORDERS: ICD-10-CM

## 2024-11-06 LAB — FUNGUS SPEC CULT: NORMAL

## 2024-11-19 LAB — FUNGUS SPEC CULT: NORMAL

## 2024-12-10 ENCOUNTER — TELEMEDICINE (OUTPATIENT)
Dept: PRIMARY CARE | Facility: CLINIC | Age: 34
End: 2024-12-10
Payer: COMMERCIAL

## 2024-12-10 DIAGNOSIS — I10 HYPERTENSION, UNSPECIFIED TYPE: ICD-10-CM

## 2024-12-10 DIAGNOSIS — E11.9 DIABETES MELLITUS TYPE 2 WITHOUT RETINOPATHY (MULTI): ICD-10-CM

## 2024-12-10 PROCEDURE — 3061F NEG MICROALBUMINURIA REV: CPT | Performed by: FAMILY MEDICINE

## 2024-12-10 PROCEDURE — 99214 OFFICE O/P EST MOD 30 MIN: CPT | Performed by: FAMILY MEDICINE

## 2024-12-10 PROCEDURE — 3044F HG A1C LEVEL LT 7.0%: CPT | Performed by: FAMILY MEDICINE

## 2024-12-10 PROCEDURE — 3048F LDL-C <100 MG/DL: CPT | Performed by: FAMILY MEDICINE

## 2024-12-10 RX ORDER — FLASH GLUCOSE SENSOR
KIT MISCELLANEOUS
Qty: 6 EACH | Refills: 1 | Status: SHIPPED | OUTPATIENT
Start: 2024-12-10

## 2024-12-10 RX ORDER — METFORMIN HYDROCHLORIDE 500 MG/1
500 TABLET ORAL
Qty: 180 TABLET | Refills: 1 | Status: SHIPPED | OUTPATIENT
Start: 2024-12-10

## 2024-12-10 RX ORDER — DULAGLUTIDE 1.5 MG/.5ML
1.5 INJECTION, SOLUTION SUBCUTANEOUS WEEKLY
Qty: 6 ML | Refills: 1 | Status: SHIPPED | OUTPATIENT
Start: 2024-12-10

## 2024-12-10 RX ORDER — LOSARTAN POTASSIUM AND HYDROCHLOROTHIAZIDE 25; 100 MG/1; MG/1
1 TABLET ORAL DAILY
Qty: 90 TABLET | Refills: 1 | Status: SHIPPED | OUTPATIENT
Start: 2024-12-10

## 2024-12-10 NOTE — ASSESSMENT & PLAN NOTE
-Well-controlled.  Trulicity dose decreased from 4.5 mg down to 1.5 mg/week per patient request today.-It has caused intense emesis at the 4.5 mg doses and the patient has requested that 1.5 mg/week dose.  -Fasting blood test ordered today.  -Medications refilled today.

## 2024-12-10 NOTE — PROGRESS NOTES
Subjective   Patient ID: Mica Ross is a 34 y.o. female who presents for her diabetes management.  HPI  The patient is a 34 yo A female with history of DM II, HTN, obesity, migraine headaches, exercise induced rhabdomyolysis, here for:    1- A discussion about her Trulicity side effects.  She states that it is causing some intense emesis within the first 2 days following her weekly injections at the dose of 4.5 mg/week that she is currently taking.    2- Medication refills and blood test order.    A review of stem was completed.  All systems were reviewed and were normal, except for the ones that are listed in the HPI.    Objective   Physical Exam    Assessment/Plan   Problem List Items Addressed This Visit       Diabetes mellitus type 2 without retinopathy (Multi)     -Well-controlled.  Trulicity dose decreased from 4.5 mg down to 1.5 mg/week per patient request today.-It has caused intense emesis at the 4.5 mg doses and the patient has requested that 1.5 mg/week dose.  -Fasting blood test ordered today.  -Medications refilled today.         Relevant Medications    dulaglutide (Trulicity) 1.5 mg/0.5 mL pen injector injection    metFORMIN (Glucophage) 500 mg tablet    flash glucose sensor kit (FreeStyle Ton 2 Sensor) kit    Other Relevant Orders    Comprehensive Metabolic Panel    Hemoglobin A1C    Lipid Panel    Hypertension    Relevant Medications    losartan-hydrochlorothiazide (Hyzaar) 100-25 mg tablet    Other Relevant Orders    Comprehensive Metabolic Panel    Lipid Panel    Patient to return to office in 4- 6 months for routine care

## 2024-12-11 ENCOUNTER — LAB (OUTPATIENT)
Dept: LAB | Facility: LAB | Age: 34
End: 2024-12-11
Payer: COMMERCIAL

## 2024-12-11 DIAGNOSIS — E11.9 DIABETES MELLITUS TYPE 2 WITHOUT RETINOPATHY (MULTI): ICD-10-CM

## 2024-12-11 DIAGNOSIS — I10 HYPERTENSION, UNSPECIFIED TYPE: ICD-10-CM

## 2024-12-11 LAB
ALBUMIN SERPL BCP-MCNC: 4.3 G/DL (ref 3.4–5)
ALP SERPL-CCNC: 41 U/L (ref 33–110)
ALT SERPL W P-5'-P-CCNC: 32 U/L (ref 7–45)
ANION GAP SERPL CALC-SCNC: 14 MMOL/L (ref 10–20)
AST SERPL W P-5'-P-CCNC: 21 U/L (ref 9–39)
BILIRUB SERPL-MCNC: 0.6 MG/DL (ref 0–1.2)
BUN SERPL-MCNC: 9 MG/DL (ref 6–23)
CALCIUM SERPL-MCNC: 9.6 MG/DL (ref 8.6–10.6)
CHLORIDE SERPL-SCNC: 101 MMOL/L (ref 98–107)
CHOLEST SERPL-MCNC: 95 MG/DL (ref 0–199)
CHOLESTEROL/HDL RATIO: 3.5
CO2 SERPL-SCNC: 29 MMOL/L (ref 21–32)
CREAT SERPL-MCNC: 0.87 MG/DL (ref 0.5–1.05)
EGFRCR SERPLBLD CKD-EPI 2021: 89 ML/MIN/1.73M*2
EST. AVERAGE GLUCOSE BLD GHB EST-MCNC: 134 MG/DL
GLUCOSE SERPL-MCNC: 120 MG/DL (ref 74–99)
HBA1C MFR BLD: 6.3 %
HDLC SERPL-MCNC: 27.5 MG/DL
LDLC SERPL CALC-MCNC: 47 MG/DL
NON HDL CHOLESTEROL: 68 MG/DL (ref 0–149)
POTASSIUM SERPL-SCNC: 3.5 MMOL/L (ref 3.5–5.3)
PROT SERPL-MCNC: 7 G/DL (ref 6.4–8.2)
SODIUM SERPL-SCNC: 140 MMOL/L (ref 136–145)
TRIGL SERPL-MCNC: 104 MG/DL (ref 0–149)
VLDL: 21 MG/DL (ref 0–40)

## 2024-12-11 PROCEDURE — 80061 LIPID PANEL: CPT

## 2024-12-11 PROCEDURE — 83036 HEMOGLOBIN GLYCOSYLATED A1C: CPT

## 2024-12-11 PROCEDURE — 80053 COMPREHEN METABOLIC PANEL: CPT

## 2024-12-11 PROCEDURE — 36415 COLL VENOUS BLD VENIPUNCTURE: CPT

## 2024-12-12 ENCOUNTER — TELEPHONE (OUTPATIENT)
Dept: PRIMARY CARE | Facility: CLINIC | Age: 34
End: 2024-12-12
Payer: COMMERCIAL

## 2024-12-12 NOTE — RESULT ENCOUNTER NOTE
Patient blood test shows that her blood sugar level slightly increased but is still in the prediabetic range.  She should continue a stricter low sugar/low carbohydrate diet, weight loss and increased exercise.  The rest of the blood tests is otherwise normal.

## 2025-02-23 ENCOUNTER — OFFICE VISIT (OUTPATIENT)
Dept: URGENT CARE | Age: 35
End: 2025-02-23

## 2025-02-23 ENCOUNTER — HOSPITAL ENCOUNTER (OUTPATIENT)
Dept: RADIOLOGY | Facility: CLINIC | Age: 35
Discharge: HOME | End: 2025-02-23
Payer: COMMERCIAL

## 2025-02-23 VITALS
SYSTOLIC BLOOD PRESSURE: 108 MMHG | TEMPERATURE: 98.6 F | HEART RATE: 110 BPM | DIASTOLIC BLOOD PRESSURE: 77 MMHG | OXYGEN SATURATION: 97 % | RESPIRATION RATE: 17 BRPM

## 2025-02-23 DIAGNOSIS — R06.09 OTHER FORM OF DYSPNEA: Primary | ICD-10-CM

## 2025-02-23 DIAGNOSIS — J06.9 ACUTE UPPER RESPIRATORY INFECTION: ICD-10-CM

## 2025-02-23 DIAGNOSIS — R06.09 OTHER FORM OF DYSPNEA: ICD-10-CM

## 2025-02-23 DIAGNOSIS — R09.81 NASAL CONGESTION: ICD-10-CM

## 2025-02-23 LAB
POC RAPID INFLUENZA A: NEGATIVE
POC RAPID INFLUENZA B: NEGATIVE
POC SARS-COV-2 AG BINAX: NORMAL

## 2025-02-23 PROCEDURE — 71046 X-RAY EXAM CHEST 2 VIEWS: CPT | Performed by: RADIOLOGY

## 2025-02-23 PROCEDURE — 99214 OFFICE O/P EST MOD 30 MIN: CPT | Performed by: SPECIALIST

## 2025-02-23 PROCEDURE — 87804 INFLUENZA ASSAY W/OPTIC: CPT | Performed by: SPECIALIST

## 2025-02-23 PROCEDURE — 87811 SARS-COV-2 COVID19 W/OPTIC: CPT | Performed by: SPECIALIST

## 2025-02-23 PROCEDURE — 99070 SPECIAL SUPPLIES PHYS/QHP: CPT | Performed by: SPECIALIST

## 2025-02-23 PROCEDURE — 71046 X-RAY EXAM CHEST 2 VIEWS: CPT

## 2025-02-23 PROCEDURE — 1036F TOBACCO NON-USER: CPT | Performed by: SPECIALIST

## 2025-02-23 PROCEDURE — 3074F SYST BP LT 130 MM HG: CPT | Performed by: SPECIALIST

## 2025-02-23 PROCEDURE — 3078F DIAST BP <80 MM HG: CPT | Performed by: SPECIALIST

## 2025-02-23 RX ORDER — PROMETHAZINE HYDROCHLORIDE AND DEXTROMETHORPHAN HYDROBROMIDE 6.25; 15 MG/5ML; MG/5ML
5 SYRUP ORAL 4 TIMES DAILY PRN
Qty: 118 ML | Refills: 0 | Status: SHIPPED | OUTPATIENT
Start: 2025-02-23 | End: 2025-03-02

## 2025-02-23 ASSESSMENT — ENCOUNTER SYMPTOMS
COUGH: 1
WHEEZING: 0
SHORTNESS OF BREATH: 1
FATIGUE: 1
SORE THROAT: 1
FEVER: 1
CHILLS: 1

## 2025-02-23 ASSESSMENT — PATIENT HEALTH QUESTIONNAIRE - PHQ9
SUM OF ALL RESPONSES TO PHQ9 QUESTIONS 1 AND 2: 0
2. FEELING DOWN, DEPRESSED OR HOPELESS: NOT AT ALL
1. LITTLE INTEREST OR PLEASURE IN DOING THINGS: NOT AT ALL

## 2025-02-23 ASSESSMENT — PAIN SCALES - GENERAL: PAINLEVEL_OUTOF10: 10-WORST PAIN EVER

## 2025-02-23 NOTE — LETTER
February 23, 2025     Patient: Mica Ross   YOB: 1990   Date of Visit: 2/23/2025       To Whom It May Concern:    Mica Ross was seen in my clinic on 2/23/2025 at 1:25 pm. Please excuse Mica for her absence from school on this day to make the appointment. May return to school on 02/25/2025    If you have any questions or concerns, please don't hesitate to call.         Sincerely,         Loreta Gerard MD        CC: No Recipients

## 2025-02-23 NOTE — PROGRESS NOTES
Subjective   Patient ID: Mica Ross is a 34 y.o. female. They present today with a chief complaint of Flu Symptoms, Chills (/), Generalized Body Aches, Fever (Tylenol 9pm last night), Cough (Chest pain x4 days), and Sore Throat.    History of Present Illness  HPI    Past Medical History  Allergies as of 02/23/2025 - Reviewed 02/23/2025   Allergen Reaction Noted    Dapagliflozin Other 07/06/2022    Penicillins Other and Nausea/vomiting 01/31/2006       (Not in a hospital admission)       Past Medical History:   Diagnosis Date    Acute upper respiratory infection, unspecified 07/01/2016    Acute URI    Acute vaginitis     Bacterial vaginosis    Atypical squamous cells of undetermined significance on cytologic smear of cervix (ASC-US) 07/20/2013    Pap smear abnormality of cervix with ASCUS favoring benign    Candidiasis, unspecified     Yeast infection    Cervical shortening, unspecified trimester     Cervix, short (affecting pregnancy)    Elevated blood-pressure reading, without diagnosis of hypertension 06/15/2015    Elevated blood pressure reading without diagnosis of hypertension    Encounter for screening for malignant neoplasm of vagina     Vaginal Pap smear    Gestational diabetes mellitus in pregnancy, unspecified control     Gestational diabetes mellitus, class A2    Less than 8 weeks gestation of pregnancy ([facility]) 11/20/2017    Less than 8 weeks gestation of pregnancy    Other conditions influencing health status 11/03/2017    History of pregnancy    Other conditions influencing health status     H/O pregnancy    Other specified noninflammatory disorders of vagina     Vaginal itching    Personal history of other diseases of the nervous system and sense organs     History of migraine    Personal history of other diseases of the nervous system and sense organs 08/02/2013    History of acute conjunctivitis    Personal history of other diseases of the respiratory system     Personal history of asthma     Personal history of other specified conditions     History of nausea    Personal history of other specified conditions     History of vomiting    Personal history of other specified conditions 11/07/2013    History of syncope    Stillbirth     Stillbirth    Type A blood, Rh positive     Blood type A+    Vomiting, unspecified     Hyperemesis       Past Surgical History:   Procedure Laterality Date    BI US GUIDED BREAST LOCALIZATION AND BIOPSY LEFT Left 12/22/2020    BI US GUIDED BREAST LOCALIZATION AND BIOPSY LEFT 12/22/2020    BI US GUIDED BREAST LOCALIZATION LEFT Left 3/2/2021    BI US GUIDED BREAST LOCALIZATION LEFT 3/2/2021    OTHER SURGICAL HISTORY  08/02/2013    [facility]nec[facility] Services Intrauterine Device (IUD) Insertion    OTHER SURGICAL HISTORY  08/02/2013    Cervical Cerclage During Pregnancy    OTHER SURGICAL HISTORY  04/09/2019    Breast biopsy excisional        reports that she has never smoked. She has never been exposed to tobacco smoke. She has never used smokeless tobacco. She reports current alcohol use. She reports that she does not use drugs.    Review of Systems  Review of Systems   Constitutional:  Positive for chills, fatigue and fever.   HENT:  Positive for congestion and sore throat.    Respiratory:  Positive for cough and shortness of breath. Negative for wheezing.                                   Objective    There were no vitals filed for this visit.  No LMP recorded. Patient has had an implant.    Physical Exam  Constitutional:       Appearance: Normal appearance.   HENT:      Right Ear: Tympanic membrane normal.      Left Ear: Tympanic membrane normal.      Nose: Nose normal.   Eyes:      Conjunctiva/sclera: Conjunctivae normal.   Cardiovascular:      Rate and Rhythm: Normal rate and regular rhythm.   Pulmonary:      Effort: Pulmonary effort is normal.   Lymphadenopathy:      Cervical: No cervical adenopathy.   Neurological:      Mental Status: She is alert.         Procedures    Point of  Care Test & Imaging Results from this visit  No results found for this visit on 02/23/25.   No results found.    Diagnostic study results (if any) were reviewed by Loreta Gerard MD.    Assessment/Plan   Allergies, medications, history, and pertinent labs/EKGs/Imaging reviewed by Loreta Gerard MD.     Medical Decision Making      Orders and Diagnoses  Diagnoses and all orders for this visit:  Nasal congestion  -     POCT Covid-19 Rapid Antigen  -     POCT Influenza A/B manually resulted      Medical Admin Record      Patient disposition: Home    Electronically signed by Loreta Gerard MD  1:17 PM

## 2025-02-24 LAB — S PYO DNA THROAT QL NAA+PROBE: NOT DETECTED

## 2025-03-18 ENCOUNTER — TELEPHONE (OUTPATIENT)
Dept: PRIMARY CARE | Facility: CLINIC | Age: 35
End: 2025-03-18

## 2025-03-18 DIAGNOSIS — E11.9 DIABETES MELLITUS TYPE 2 WITHOUT RETINOPATHY (MULTI): Primary | ICD-10-CM

## 2025-03-25 RX ORDER — BLOOD-GLUCOSE SENSOR
EACH MISCELLANEOUS
Qty: 6 EACH | Refills: 1 | Status: SHIPPED | OUTPATIENT
Start: 2025-03-25

## 2025-05-15 DIAGNOSIS — E11.9 DIABETES MELLITUS TYPE 2 WITHOUT RETINOPATHY (MULTI): Primary | ICD-10-CM

## 2025-05-22 RX ORDER — BLOOD-GLUCOSE SENSOR
EACH MISCELLANEOUS
Qty: 6 EACH | Refills: 1 | Status: SHIPPED | OUTPATIENT
Start: 2025-05-22

## 2025-06-06 DIAGNOSIS — E11.9 DIABETES MELLITUS TYPE 2 WITHOUT RETINOPATHY (MULTI): Primary | ICD-10-CM

## 2025-06-07 ENCOUNTER — HOSPITAL ENCOUNTER (EMERGENCY)
Facility: HOSPITAL | Age: 35
Discharge: HOME | End: 2025-06-07
Attending: EMERGENCY MEDICINE
Payer: COMMERCIAL

## 2025-06-07 VITALS
HEIGHT: 63 IN | HEART RATE: 87 BPM | SYSTOLIC BLOOD PRESSURE: 122 MMHG | RESPIRATION RATE: 18 BRPM | TEMPERATURE: 97.9 F | WEIGHT: 190 LBS | OXYGEN SATURATION: 97 % | DIASTOLIC BLOOD PRESSURE: 84 MMHG | BODY MASS INDEX: 33.66 KG/M2

## 2025-06-07 DIAGNOSIS — K08.89 PAIN, DENTAL: Primary | ICD-10-CM

## 2025-06-07 PROCEDURE — 2500000004 HC RX 250 GENERAL PHARMACY W/ HCPCS (ALT 636 FOR OP/ED): Performed by: EMERGENCY MEDICINE

## 2025-06-07 PROCEDURE — 2500000001 HC RX 250 WO HCPCS SELF ADMINISTERED DRUGS (ALT 637 FOR MEDICARE OP): Performed by: EMERGENCY MEDICINE

## 2025-06-07 PROCEDURE — 99283 EMERGENCY DEPT VISIT LOW MDM: CPT | Performed by: EMERGENCY MEDICINE

## 2025-06-07 RX ORDER — OXYCODONE AND ACETAMINOPHEN 5; 325 MG/1; MG/1
1 TABLET ORAL ONCE
Refills: 0 | Status: COMPLETED | OUTPATIENT
Start: 2025-06-07 | End: 2025-06-07

## 2025-06-07 RX ORDER — OXYCODONE AND ACETAMINOPHEN 5; 325 MG/1; MG/1
1 TABLET ORAL EVERY 6 HOURS PRN
Qty: 10 TABLET | Refills: 0 | Status: SHIPPED | OUTPATIENT
Start: 2025-06-07 | End: 2025-06-10

## 2025-06-07 RX ORDER — ONDANSETRON 4 MG/1
4 TABLET, FILM COATED ORAL EVERY 6 HOURS
Qty: 12 TABLET | Refills: 0 | Status: SHIPPED | OUTPATIENT
Start: 2025-06-07 | End: 2025-06-10

## 2025-06-07 RX ORDER — ONDANSETRON 4 MG/1
4 TABLET, ORALLY DISINTEGRATING ORAL ONCE
Status: COMPLETED | OUTPATIENT
Start: 2025-06-07 | End: 2025-06-07

## 2025-06-07 RX ADMIN — ONDANSETRON 4 MG: 4 TABLET, ORALLY DISINTEGRATING ORAL at 20:11

## 2025-06-07 RX ADMIN — OXYCODONE HYDROCHLORIDE AND ACETAMINOPHEN 1 TABLET: 5; 325 TABLET ORAL at 20:11

## 2025-06-07 ASSESSMENT — COLUMBIA-SUICIDE SEVERITY RATING SCALE - C-SSRS
6. HAVE YOU EVER DONE ANYTHING, STARTED TO DO ANYTHING, OR PREPARED TO DO ANYTHING TO END YOUR LIFE?: NO
2. HAVE YOU ACTUALLY HAD ANY THOUGHTS OF KILLING YOURSELF?: NO
1. IN THE PAST MONTH, HAVE YOU WISHED YOU WERE DEAD OR WISHED YOU COULD GO TO SLEEP AND NOT WAKE UP?: NO

## 2025-06-07 ASSESSMENT — PAIN - FUNCTIONAL ASSESSMENT
PAIN_FUNCTIONAL_ASSESSMENT: 0-10
PAIN_FUNCTIONAL_ASSESSMENT: 0-10

## 2025-06-07 ASSESSMENT — PAIN SCALES - GENERAL: PAINLEVEL_OUTOF10: 10 - WORST POSSIBLE PAIN

## 2025-06-07 ASSESSMENT — PAIN DESCRIPTION - LOCATION: LOCATION: MOUTH

## 2025-06-07 NOTE — ED PROVIDER NOTES
Emergency Department Provider Note       History of Present Illness     History provided by: Patient  Limitations to History: None  External Records Reviewed with Brief Summary: None    HPI:  Mica Ross is a 34 y.o. female here with dental pain.  Right lower molar.  She was seen by dentist to give antibiotics with Motrin with plan for outpatient root canal    Physical Exam   Triage vitals:  T 36.6 °C (97.9 °F)  HR 87  /84  RR 18  O2 97 % None (Room air)    Physical Exam  Vitals and nursing note reviewed.   Constitutional:       General: She is not in acute distress.     Appearance: Normal appearance. She is normal weight. She is not ill-appearing.   HENT:      Head: Normocephalic and atraumatic.      Nose: Nose normal.      Mouth/Throat:      Mouth: Mucous membranes are moist.      Pharynx: Oropharynx is clear.      Comments: There is a cavity noted to the right lower molar.  No Ajay's angina.  No swelling to the neck.  Airway is intact.  Handling secretions  Eyes:      Extraocular Movements: Extraocular movements intact.      Conjunctiva/sclera: Conjunctivae normal.      Pupils: Pupils are equal, round, and reactive to light.   Cardiovascular:      Rate and Rhythm: Normal rate and regular rhythm.   Pulmonary:      Effort: Pulmonary effort is normal.      Breath sounds: Normal breath sounds.   Abdominal:      General: Abdomen is flat. Bowel sounds are normal.      Palpations: Abdomen is soft.   Musculoskeletal:      Cervical back: Neck supple.   Skin:     Capillary Refill: Capillary refill takes less than 2 seconds.   Neurological:      General: No focal deficit present.      Mental Status: She is alert and oriented to person, place, and time. Mental status is at baseline.      Sensory: No sensory deficit.      Motor: No weakness.   Psychiatric:         Mood and Affect: Mood normal.         Behavior: Behavior normal.         Thought Content: Thought content normal.         Judgment: Judgment normal.            Medical Decision Making & ED Course   Medical Decision Makin y.o. female here with dental pain.  Does have a cavity noted to right lower molar.  Provided with Percocet she already is taking Augmentin.  No signs for ludwigs angina.  I did provide a course of Percocet as well as Zofran and she will follow-up with her dentist to continue care.  Return precautions are discussed.  ----      Differential diagnoses considered include but are not limited to: dental abscess cellulitis cavity    Social Determinants of Health which Significantly Impact Care: Social Determinants of Health which Significantly Impact Care: None identified     EKG Independent Interpretation: EKG not obtained    Independent Result Review and Interpretation: Relevant laboratory and radiographic results were reviewed and independently interpreted by myself.  As necessary, they are commented on in the ED Course.    Chronic conditions affecting the patient's care: As documented above in [facility]    The patient was discussed with the following consultants/services: None    Care Considerations: As documented above in [facility]    ED Course:  Diagnoses as of 25   Pain, dental       Disposition   As a result of the work-up, the patient was discharged home.  she was informed of her diagnosis and instructed to come back with any concerns or worsening of condition.  she and was agreeable to the plan as discussed above.  she was given the opportunity to ask questions.  All of the patient's questions were answered.    Procedures   Procedures        Seth Elder MD  Emergency Medicine                                                       Seth Elder MD  25

## 2025-06-07 NOTE — ED TRIAGE NOTES
Pt c/o tooth pain, was seen at dentist yesterday, was given ATB and motrin. Pt. States pain is worsening in mouth and head,

## 2025-06-07 NOTE — DISCHARGE INSTRUCTIONS
Take the Percocet and Motrin as previously prescribed to treat for your dental pain.  Please follow-up with the dentist for root canal.  Please return ED for worsening pain, inability to drain, vomiting or any other concerning symptoms  
Follow up with outpatient [facility] [place],

## 2025-06-09 RX ORDER — DULAGLUTIDE 1.5 MG/.5ML
1.5 INJECTION, SOLUTION SUBCUTANEOUS WEEKLY
Qty: 6 ML | Refills: 1 | Status: SHIPPED | OUTPATIENT
Start: 2025-06-09

## 2025-07-18 ENCOUNTER — TELEPHONE (OUTPATIENT)
Dept: PRIMARY CARE | Facility: CLINIC | Age: 35
End: 2025-07-18
Payer: COMMERCIAL

## 2025-07-18 DIAGNOSIS — E11.9 DIABETES MELLITUS TYPE 2 WITHOUT RETINOPATHY (MULTI): ICD-10-CM

## 2025-07-18 DIAGNOSIS — E78.2 MIXED HYPERLIPIDEMIA: ICD-10-CM

## 2025-07-18 DIAGNOSIS — I10 PRIMARY HYPERTENSION: ICD-10-CM

## 2025-07-18 DIAGNOSIS — I44.0 FIRST DEGREE AV BLOCK: Primary | ICD-10-CM

## 2025-07-18 NOTE — TELEPHONE ENCOUNTER
Pt. Called wanting to know if she can have an order placed for labs prior to her appt on the 28th.  She stated that she is worried about her dizzy spells and stated that her blood pressure has been a little lower than usual.

## 2025-07-21 NOTE — TELEPHONE ENCOUNTER
Left vm for pt. Stating that labs orders have been placed and she is free to get the lab work done at her  convenience

## 2025-07-26 LAB
ALBUMIN SERPL-MCNC: 4.8 G/DL (ref 3.6–5.1)
ALBUMIN/CREAT UR: NORMAL
ALP SERPL-CCNC: 37 U/L (ref 31–125)
ALT SERPL-CCNC: 25 U/L (ref 6–29)
ANION GAP SERPL CALCULATED.4IONS-SCNC: 9 MMOL/L (CALC) (ref 7–17)
AST SERPL-CCNC: 19 U/L (ref 10–30)
BILIRUB SERPL-MCNC: 0.5 MG/DL (ref 0.2–1.2)
BUN SERPL-MCNC: 10 MG/DL (ref 7–25)
CALCIUM SERPL-MCNC: 10.3 MG/DL (ref 8.6–10.2)
CHLORIDE SERPL-SCNC: 101 MMOL/L (ref 98–110)
CHOLEST SERPL-MCNC: 88 MG/DL
CHOLEST/HDLC SERPL: 3.1 (CALC)
CO2 SERPL-SCNC: 28 MMOL/L (ref 20–32)
CREAT SERPL-MCNC: 0.97 MG/DL (ref 0.5–0.97)
CREAT UR-MCNC: NORMAL MG/DL
EGFRCR SERPLBLD CKD-EPI 2021: 78 ML/MIN/1.73M2
ERYTHROCYTE [DISTWIDTH] IN BLOOD BY AUTOMATED COUNT: 13 % (ref 11–15)
EST. AVERAGE GLUCOSE BLD GHB EST-MCNC: 146 MG/DL
EST. AVERAGE GLUCOSE BLD GHB EST-SCNC: 8.1 MMOL/L
GLUCOSE SERPL-MCNC: 208 MG/DL (ref 65–139)
HBA1C MFR BLD: 6.7 %
HCT VFR BLD AUTO: 43.1 % (ref 35–45)
HDLC SERPL-MCNC: 28 MG/DL
HGB BLD-MCNC: 14.4 G/DL (ref 11.7–15.5)
LDLC SERPL CALC-MCNC: 38 MG/DL (CALC)
MCH RBC QN AUTO: 30.4 PG (ref 27–33)
MCHC RBC AUTO-ENTMCNC: 33.4 G/DL (ref 32–36)
MCV RBC AUTO: 91.1 FL (ref 80–100)
MICROALBUMIN UR-MCNC: NORMAL
NONHDLC SERPL-MCNC: 60 MG/DL (CALC)
PLATELET # BLD AUTO: 287 THOUSAND/UL (ref 140–400)
PMV BLD REES-ECKER: 10.7 FL (ref 7.5–12.5)
POTASSIUM SERPL-SCNC: 3.6 MMOL/L (ref 3.5–5.3)
PROT SERPL-MCNC: 7.3 G/DL (ref 6.1–8.1)
RBC # BLD AUTO: 4.73 MILLION/UL (ref 3.8–5.1)
SODIUM SERPL-SCNC: 138 MMOL/L (ref 135–146)
TRIGL SERPL-MCNC: 139 MG/DL
TSH SERPL-ACNC: 1.44 MIU/L
WBC # BLD AUTO: 5.3 THOUSAND/UL (ref 3.8–10.8)

## 2025-07-28 ENCOUNTER — APPOINTMENT (OUTPATIENT)
Dept: PRIMARY CARE | Facility: CLINIC | Age: 35
End: 2025-07-28
Payer: COMMERCIAL

## 2025-07-28 VITALS
HEART RATE: 99 BPM | DIASTOLIC BLOOD PRESSURE: 82 MMHG | BODY MASS INDEX: 33.83 KG/M2 | SYSTOLIC BLOOD PRESSURE: 117 MMHG | TEMPERATURE: 97.6 F | WEIGHT: 191 LBS

## 2025-07-28 DIAGNOSIS — E11.9 DIABETES MELLITUS TYPE 2 WITHOUT RETINOPATHY (MULTI): ICD-10-CM

## 2025-07-28 DIAGNOSIS — E08.00 DIABETES MELLITUS DUE TO UNDERLYING CONDITION WITH HYPEROSMOLARITY WITHOUT COMA, WITHOUT LONG-TERM CURRENT USE OF INSULIN: ICD-10-CM

## 2025-07-28 DIAGNOSIS — I10 PRIMARY HYPERTENSION: ICD-10-CM

## 2025-07-28 DIAGNOSIS — R42 DIZZINESS: Primary | ICD-10-CM

## 2025-07-28 DIAGNOSIS — R63.5 ABNORMAL WEIGHT GAIN: ICD-10-CM

## 2025-07-28 LAB
ALBUMIN SERPL-MCNC: 4.8 G/DL (ref 3.6–5.1)
ALBUMIN/CREAT UR: 2 MG/G CREAT
ALP SERPL-CCNC: 37 U/L (ref 31–125)
ALT SERPL-CCNC: 25 U/L (ref 6–29)
ANION GAP SERPL CALCULATED.4IONS-SCNC: 9 MMOL/L (CALC) (ref 7–17)
AST SERPL-CCNC: 19 U/L (ref 10–30)
BILIRUB SERPL-MCNC: 0.5 MG/DL (ref 0.2–1.2)
BUN SERPL-MCNC: 10 MG/DL (ref 7–25)
CALCIUM SERPL-MCNC: 10.3 MG/DL (ref 8.6–10.2)
CHLORIDE SERPL-SCNC: 101 MMOL/L (ref 98–110)
CHOLEST SERPL-MCNC: 88 MG/DL
CHOLEST/HDLC SERPL: 3.1 (CALC)
CO2 SERPL-SCNC: 28 MMOL/L (ref 20–32)
CREAT SERPL-MCNC: 0.97 MG/DL (ref 0.5–0.97)
CREAT UR-MCNC: 111 MG/DL (ref 20–275)
EGFRCR SERPLBLD CKD-EPI 2021: 78 ML/MIN/1.73M2
ERYTHROCYTE [DISTWIDTH] IN BLOOD BY AUTOMATED COUNT: 13 % (ref 11–15)
EST. AVERAGE GLUCOSE BLD GHB EST-MCNC: 146 MG/DL
EST. AVERAGE GLUCOSE BLD GHB EST-SCNC: 8.1 MMOL/L
GLUCOSE SERPL-MCNC: 208 MG/DL (ref 65–139)
HBA1C MFR BLD: 6.7 %
HCT VFR BLD AUTO: 43.1 % (ref 35–45)
HDLC SERPL-MCNC: 28 MG/DL
HGB BLD-MCNC: 14.4 G/DL (ref 11.7–15.5)
LDLC SERPL CALC-MCNC: 38 MG/DL (CALC)
MCH RBC QN AUTO: 30.4 PG (ref 27–33)
MCHC RBC AUTO-ENTMCNC: 33.4 G/DL (ref 32–36)
MCV RBC AUTO: 91.1 FL (ref 80–100)
MICROALBUMIN UR-MCNC: 0.2 MG/DL
NONHDLC SERPL-MCNC: 60 MG/DL (CALC)
PLATELET # BLD AUTO: 287 THOUSAND/UL (ref 140–400)
PMV BLD REES-ECKER: 10.7 FL (ref 7.5–12.5)
POTASSIUM SERPL-SCNC: 3.6 MMOL/L (ref 3.5–5.3)
PROT SERPL-MCNC: 7.3 G/DL (ref 6.1–8.1)
RBC # BLD AUTO: 4.73 MILLION/UL (ref 3.8–5.1)
SODIUM SERPL-SCNC: 138 MMOL/L (ref 135–146)
TRIGL SERPL-MCNC: 139 MG/DL
TSH SERPL-ACNC: 1.44 MIU/L
WBC # BLD AUTO: 5.3 THOUSAND/UL (ref 3.8–10.8)

## 2025-07-28 PROCEDURE — 1036F TOBACCO NON-USER: CPT | Performed by: FAMILY MEDICINE

## 2025-07-28 PROCEDURE — 3079F DIAST BP 80-89 MM HG: CPT | Performed by: FAMILY MEDICINE

## 2025-07-28 PROCEDURE — 3074F SYST BP LT 130 MM HG: CPT | Performed by: FAMILY MEDICINE

## 2025-07-28 PROCEDURE — 99214 OFFICE O/P EST MOD 30 MIN: CPT | Performed by: FAMILY MEDICINE

## 2025-07-28 RX ORDER — LOSARTAN POTASSIUM AND HYDROCHLOROTHIAZIDE 12.5; 1 MG/1; MG/1
1 TABLET ORAL DAILY
Qty: 30 TABLET | Refills: 5 | Status: SHIPPED | OUTPATIENT
Start: 2025-07-28 | End: 2026-07-28

## 2025-07-28 ASSESSMENT — ENCOUNTER SYMPTOMS: DIZZINESS: 1

## 2025-07-28 NOTE — PROGRESS NOTES
Subjective   Patient ID: Mica Ross is a 34 y.o. female who presents for Dizziness.  Dizziness    The patient is a 33 yo A female with history of DM II, HTN, obesity, migraine headaches, exercise induced rhabdomyolysis, here for:    1- Dizziness for few weeks.  Her BP has improved with life style changes and she is suspecting that it is caused by her BP medication being too strong.    2- Weight gain management.     A review of system was completed.  All systems were reviewed and were normal, except for the ones that are listed in the HPI.    Objective   Physical Exam  Constitutional:       Appearance: Normal appearance.   HENT:      Head: Normocephalic and atraumatic.      Right Ear: Tympanic membrane, ear canal and external ear normal.      Left Ear: Tympanic membrane, ear canal and external ear normal.      Nose: Nose normal.      Mouth/Throat:      Mouth: Mucous membranes are moist.      Pharynx: Oropharynx is clear.     Eyes:      Extraocular Movements: Extraocular movements intact.      Conjunctiva/sclera: Conjunctivae normal.      Pupils: Pupils are equal, round, and reactive to light.       Cardiovascular:      Rate and Rhythm: Normal rate and regular rhythm.      Pulses: Normal pulses.   Pulmonary:      Effort: Pulmonary effort is normal.      Breath sounds: Normal breath sounds.   Abdominal:      General: Abdomen is flat. Bowel sounds are normal.      Palpations: Abdomen is soft.     Musculoskeletal:         General: Normal range of motion.      Cervical back: Normal range of motion and neck supple.     Skin:     General: Skin is warm.     Neurological:      General: No focal deficit present.      Mental Status: She is alert and oriented to person, place, and time. Mental status is at baseline.     Psychiatric:         Mood and Affect: Mood normal.         Behavior: Behavior normal.         Thought Content: Thought content normal.         Judgment: Judgment normal.     Assessment/Plan   Problem List Items  Addressed This Visit       Diabetes mellitus type 2 without retinopathy (Multi)    Relevant Medications    semaglutide, weight loss, 1 mg/0.5 mL pen injector    Other Relevant Orders    Referral to Clinical Pharmacy    Hypertension    Relevant Medications    losartan-hydrochlorothiazide (Hyzaar) 100-12.5 mg tablet    Dizziness - Primary    -Suspected to be from an orthostatic BP.  -Losartan / hydrochlorothiazide decreased down to 100/12.5 mg every day today.          Abnormal weight gain    -Trulicity is not helping her weight.  Stopped.  -Zepbound caused an allergic reaction.  -Wegovy 1mg/ week started today.          Diabetes mellitus due to underlying condition with hyperosmolarity without coma, without long-term current use of insulin    Relevant Medications    semaglutide, weight loss, 1 mg/0.5 mL pen injector    Patient to return to office in 3 months.

## 2025-07-28 NOTE — ASSESSMENT & PLAN NOTE
-Suspected to be from an orthostatic BP.  -Losartan / hydrochlorothiazide decreased down to 100/12.5 mg every day today.

## 2025-07-28 NOTE — ASSESSMENT & PLAN NOTE
-Trulicity is not helping her weight.  Stopped.  -Zepbound caused an allergic reaction.  -Wegovy 1mg/ week started today.

## 2025-08-04 ENCOUNTER — APPOINTMENT (OUTPATIENT)
Dept: PHARMACY | Facility: HOSPITAL | Age: 35
End: 2025-08-04
Payer: COMMERCIAL

## 2025-08-04 DIAGNOSIS — E11.9 DIABETES MELLITUS TYPE 2 WITHOUT RETINOPATHY (MULTI): ICD-10-CM

## 2025-08-04 DIAGNOSIS — E11.9 DIABETES MELLITUS TYPE 2 WITHOUT RETINOPATHY (MULTI): Primary | ICD-10-CM

## 2025-08-04 PROCEDURE — RXMED WILLOW AMBULATORY MEDICATION CHARGE

## 2025-08-04 RX ORDER — SEMAGLUTIDE 1 MG/.5ML
1 INJECTION, SOLUTION SUBCUTANEOUS WEEKLY
Qty: 2 ML | Refills: 0 | Status: SHIPPED | OUTPATIENT
Start: 2025-08-04 | End: 2025-09-03

## 2025-08-04 RX ORDER — SEMAGLUTIDE 1 MG/.5ML
1 INJECTION, SOLUTION SUBCUTANEOUS WEEKLY
Qty: 2 ML | Refills: 0 | Status: SHIPPED | OUTPATIENT
Start: 2025-08-04 | End: 2025-08-04 | Stop reason: SDUPTHER

## 2025-08-04 NOTE — PROGRESS NOTES
Patient is sent at the request of Teetee Marie for my opinion regarding Type 2 diabetes.  My final recommendations will be communicated back to the requesting provider by way of shared medical record.    Keegan Ross is a 35 y.o. year old female patient with  type 2 diabetes referred to Clinical Pharmacy. Recently discontinued Trulicity 1.5 mg as she was not seeing any weight benefit. Semaglutide PA (Wegovy and Ozempic) was denied and patient cannot tolerate Mounjaro due to allergic reaction of hives in the past. Recent A1c of 6.7% (7/25/25), weight 191 lbs and BMI 33.83 kg/m2.       Past Medical History:  She has a past medical history of Acute upper respiratory infection, unspecified (07/01/2016), Acute vaginitis, Atypical squamous cells of undetermined significance on cytologic smear of cervix (ASC-US) (07/20/2013), Candidiasis, unspecified, Cervical shortening, unspecified trimester, Elevated blood-pressure reading, without diagnosis of hypertension (06/15/2015), Encounter for screening for malignant neoplasm of vagina, Gestational diabetes mellitus in pregnancy, unspecified control, Hypertension, Less than 8 weeks gestation of pregnancy ([facility]) (11/20/2017), Other conditions influencing health status (11/03/2017), Other conditions influencing health status, Other specified noninflammatory disorders of vagina, Personal history of other diseases of the nervous system and sense organs, Personal history of other diseases of the nervous system and sense organs (08/02/2013), Personal history of other diseases of the respiratory system, Personal history of other specified conditions, Personal history of other specified conditions, Personal history of other specified conditions (11/07/2013), Stillbirth, Type A blood, Rh positive, and Vomiting, unspecified.    Past Surgical History:  She has a past surgical history that includes Other surgical history (08/02/2013); Other surgical history  (08/02/2013); Other surgical history (04/09/2019); BI US guided breast localization and biopsy left (Left, 12/22/2020); and BI US guided breast localization left (Left, 3/2/2021).    Social History:  She reports that she has never smoked. She has never been exposed to tobacco smoke. She has never used smokeless tobacco. She reports that she does not currently use alcohol. She reports that she does not use drugs.    Family History:  Family History[1]    Allergies:  Dapagliflozin, Mounjaro [tirzepatide], and Penicillins    DIABETES MELLITUS TYPE 2:    Known diabetic complications: none.  Does patient follow with Endocrinology: No     Lifestyle:  Diet: 2 meals/day.   Tries to eat clean   BK: egg whites, nolasco and Slovenian toast   LN: chicken breast, vegetable and starch   DN: skips  Snacks: has sweet tooth, protein bar  Drinks: water, nicci green tea, sweet iced tea with lemon   Exercise:   Walks 4 miles twice a week    Current diabetic medications include:  Metformin 500 mg daily   Trulicity 1.5 mg weekly (Tuesday)     Past diabetic medications include:  Mounjaro - caused hives  Trulicity - stopped because she wasn't experiencing any weight loss as well as intense emesis on higher dose of 4.5 mg.  Ozempic: high dose caused nausea/vomiting     Clarifications to above regimen: n/a  Adverse Effects: none currently     Glucose Readings:  Glucometer/CGM Type: Freestyl Ton 3 plus    Current home BG readings (mg/dL): not assessed   Previous home BG readings (mg/dL): n/a    Any episodes of hypoglycemia? No,  .    Did patient treat episode of hypoglycemia appropriately? N/A  Does the patient have a prescription for ready-to-use Glucagon? Not on insulin    Primary Prevention:   Statin? Yes, atorvastatin 10 mg   ACE-I/ARB? Yes, losartan 100 mg   Aspirin? No    Pertinent PMH Review:  PMH of Pancreatitis: No  PMH of Retinopathy: No  PMH of Urinary Tract Infections: No  PMH of MTC: No  PMH of MEN2: No  UACR/EGFR in last year?:  Yes    Drug Interactions:  none    Objective     Last Recorded Vitals:  BP Readings from Last 6 Encounters:   07/28/25 117/82   06/07/25 122/84   02/23/25 108/77   09/12/24 104/82   08/28/24 104/74   02/26/24 115/82        Wt Readings from Last 6 Encounters:   07/28/25 86.6 kg (191 lb)   06/07/25 86.2 kg (190 lb)   09/12/24 87.1 kg (192 lb)   08/28/24 86.6 kg (191 lb)   02/26/24 88.5 kg (195 lb)   11/24/23 86.2 kg (190 lb 0.6 oz)       BMI Readings from Last 6 Encounters:   07/28/25 33.83 kg/m²   06/07/25 33.66 kg/m²   09/12/24 34.01 kg/m²   08/28/24 33.83 kg/m²   02/26/24 34.54 kg/m²   11/24/23 33.66 kg/m²       Lab Review  Lab Results   Component Value Date    BILITOT 0.5 07/25/2025    CALCIUM 10.3 (H) 07/25/2025    CO2 28 07/25/2025     07/25/2025    CREATININE 0.97 07/25/2025    GLUCOSE 208 (H) 07/25/2025    ALKPHOS 37 07/25/2025    K 3.6 07/25/2025    PROT 7.3 07/25/2025     07/25/2025    AST 19 07/25/2025    ALT 25 07/25/2025    BUN 10 07/25/2025    ANIONGAP 9 07/25/2025     11/20/2017    ALBUMIN 4.8 07/25/2025    LIPASE 96 (H) 09/16/2022    GFRF 80 10/14/2022     Lab Results   Component Value Date    TRIG 139 07/25/2025    CHOL 88 07/25/2025    LDLCALC 38 07/25/2025    HDL 28 (L) 07/25/2025     Lab Results   Component Value Date    HGBA1C 6.7 (H) 07/25/2025    HGBA1C 6.3 (H) 12/11/2024    HGBA1C 6.0 (H) 02/26/2024     ALBUMIN/CREATININE RATIO, RANDOM URINE   Date Value Ref Range Status   07/25/2025 2 <30 mg/g creat Final     Comment:        The ADA defines abnormalities in albumin  excretion as follows:     Albuminuria Category        Result (mg/g creatinine)     Normal to Mildly increased   <30  Moderately increased            Severely increased           > OR = 300     The ADA recommends that at least two of three  specimens collected within a 3-6 month period be  abnormal before considering a patient to be  within a diagnostic category.       Albumin/Creatinine Ratio   Date Value  Ref Range Status   02/26/2024 4.3 <30.0 ug/mg Creat Final     Albumin/Creatine Ratio   Date Value Ref Range Status   11/25/2019 SEE COMMENT 0.0 - 30.0 ug/mg crt Final     Comment:     One or more analytes used in this calculation   is outside of the analytical measurement range.  Calculation cannot be performed.       The ASCVD Risk score ([place], et al., 2019) failed to calculate for the following reasons:    The 2019 ASCVD risk score is only valid for ages 40 to 79    Wegovy Education:  Counseled patient on Wegovy MOA, expectations, side effects, duration of therapy, administration, and monitoring parameters.  Counseled patient on the benefits of GLP-1ra, such as cardiovascular risk reduction, glycemic control, and weight loss potential.  Provided detailed dosing and administration counseling to ensure proper technique.   Reviewed Wegovy titration schedule, starting with 0.25 mg once weekly to 0.5 mg, 1 mg, 1.7mg, and if tolerated 2.4 mg.  Reviewed storage requirements of Wegovy when not in use, and when to administer the medication if a dose is missed.  Discussed risks of GLP1ra including risk of pancreatitis, MTC and worsening of DR  Advised patient that they may experience improved satiety after meals and portion sizes of meals may be reduced as doses of Wegovy increase.    Assessment/Plan   Problem List Items Addressed This Visit    None    Patient presented to discuss alternative GLP1 injectable medication options as Trulicity has not been effective for weight management.   Wegovy PA was denied. Patient is open to trailing Wegovy with cash pay ($500/month) option.   Discussed importance of practicing healthy eating habits and lifestyle modifications in combination with Wegovy for more sustainable weight loss.   Reviewed diet modifications including switching to sugar free Manolo green tea or using crystal lite or belen drops for decreased caloric and sugar intake.     Patients diabetes is poorly controlled  with most recent A1c of 6.7% (Goal < 7%).   Initiate: Wegovy 1 mg weekly injection   Sent script to [facility] for pickup  Continue: Metformin 500 mg daily   Discontinue: Trulicity 1.5 mg weekly injection    Compliance at present is estimated to be fair. Efforts to improve compliance will be directed at dietary modifications: lowering sugar and carbohydrate intake.  Call clinical pharmacist at [phone] for any questions prior to follow up     Follow up with Clinical Pharmacy Team 8/25/25 at 11 am    Time spent with pt: Total length of time 45 (minutes) of the encounter and more than 50% was spent counseling the patient.    Continue all meds under the continuation of care with the referring provider and clinical pharmacy team.    Please reach out to the Clinical Pharmacy Team if there are any further questions.     Verbal consent to manage patient's drug therapy was obtained from patient. They were informed they may decline to participate or withdraw from participation in pharmacy services at any time.    Marilyn Valles, PharmD  Clinical Pharmacist  [phone]           [1] No family history on file.

## 2025-08-06 ENCOUNTER — PHARMACY VISIT (OUTPATIENT)
Dept: PHARMACY | Facility: CLINIC | Age: 35
End: 2025-08-06
Payer: COMMERCIAL

## 2025-08-18 DIAGNOSIS — E11.9 DIABETES MELLITUS TYPE 2 WITHOUT RETINOPATHY (MULTI): Primary | ICD-10-CM

## 2025-08-18 RX ORDER — METFORMIN HYDROCHLORIDE 500 MG/1
500 TABLET ORAL
Qty: 180 TABLET | Refills: 1 | Status: SHIPPED | OUTPATIENT
Start: 2025-08-18

## 2025-08-25 ENCOUNTER — APPOINTMENT (OUTPATIENT)
Dept: PHARMACY | Facility: HOSPITAL | Age: 35
End: 2025-08-25
Payer: COMMERCIAL

## 2025-08-25 DIAGNOSIS — E11.9 DIABETES MELLITUS TYPE 2 WITHOUT RETINOPATHY (MULTI): ICD-10-CM

## 2025-08-25 PROCEDURE — RXMED WILLOW AMBULATORY MEDICATION CHARGE

## 2025-08-25 RX ORDER — SEMAGLUTIDE 1.7 MG/.75ML
1.7 INJECTION, SOLUTION SUBCUTANEOUS WEEKLY
Qty: 3 ML | Refills: 1 | Status: SHIPPED | OUTPATIENT
Start: 2025-08-25

## 2025-08-26 ENCOUNTER — PHARMACY VISIT (OUTPATIENT)
Dept: PHARMACY | Facility: CLINIC | Age: 35
End: 2025-08-26
Payer: COMMERCIAL

## 2025-09-22 ENCOUNTER — APPOINTMENT (OUTPATIENT)
Dept: PHARMACY | Facility: HOSPITAL | Age: 35
End: 2025-09-22
Payer: COMMERCIAL